# Patient Record
Sex: FEMALE | Race: WHITE | NOT HISPANIC OR LATINO | Employment: FULL TIME | URBAN - METROPOLITAN AREA
[De-identification: names, ages, dates, MRNs, and addresses within clinical notes are randomized per-mention and may not be internally consistent; named-entity substitution may affect disease eponyms.]

---

## 2021-07-01 ENCOUNTER — APPOINTMENT (EMERGENCY)
Dept: ULTRASOUND IMAGING | Facility: HOSPITAL | Age: 51
End: 2021-07-01
Payer: COMMERCIAL

## 2021-07-01 ENCOUNTER — HOSPITAL ENCOUNTER (EMERGENCY)
Facility: HOSPITAL | Age: 51
Discharge: HOME | End: 2021-07-01
Payer: COMMERCIAL

## 2021-07-01 ENCOUNTER — APPOINTMENT (EMERGENCY)
Dept: CT IMAGING | Facility: HOSPITAL | Age: 51
End: 2021-07-01
Payer: COMMERCIAL

## 2021-07-01 VITALS
TEMPERATURE: 97.9 F | RESPIRATION RATE: 18 BRPM | WEIGHT: 137 LBS | DIASTOLIC BLOOD PRESSURE: 56 MMHG | HEART RATE: 65 BPM | OXYGEN SATURATION: 99 % | SYSTOLIC BLOOD PRESSURE: 116 MMHG

## 2021-07-01 DIAGNOSIS — R10.11 RUQ PAIN: Primary | ICD-10-CM

## 2021-07-01 DIAGNOSIS — R74.01 TRANSAMINITIS: ICD-10-CM

## 2021-07-01 DIAGNOSIS — R16.0 HEPATOMEGALY: ICD-10-CM

## 2021-07-01 LAB
ALBUMIN SERPL BCP-MCNC: 4.4 G/DL (ref 3.4–4.8)
ALP SERPL-CCNC: 73.2 U/L (ref 35–140)
ALT SERPL W P-5'-P-CCNC: 103 U/L (ref 5–54)
ANION GAP SERPL CALCULATED.3IONS-SCNC: 5 MMOL/L (ref 4–13)
AST SERPL W P-5'-P-CCNC: 94 U/L (ref 15–41)
BASOPHILS # BLD AUTO: 0.03 THOUSANDS/ΜL (ref 0–0.1)
BASOPHILS NFR BLD AUTO: 0 % (ref 0–1)
BILIRUB SERPL-MCNC: 0.55 MG/DL (ref 0.3–1.2)
BILIRUB UR QL STRIP: NEGATIVE
BUN SERPL-MCNC: 12 MG/DL (ref 6–20)
CALCIUM SERPL-MCNC: 9.2 MG/DL (ref 8.4–10.2)
CHLORIDE SERPL-SCNC: 105 MMOL/L (ref 96–108)
CLARITY UR: CLEAR
CO2 SERPL-SCNC: 30 MMOL/L (ref 22–33)
COLOR UR: YELLOW
CREAT SERPL-MCNC: 0.93 MG/DL (ref 0.4–1.1)
EOSINOPHIL # BLD AUTO: 0.11 THOUSAND/ΜL (ref 0–0.61)
EOSINOPHIL NFR BLD AUTO: 1 % (ref 0–6)
ERYTHROCYTE [DISTWIDTH] IN BLOOD BY AUTOMATED COUNT: 12.6 % (ref 11.6–15.1)
EXT PREG TEST URINE: NEGATIVE
EXT. CONTROL ED NAV: NORMAL
GFR SERPL CREATININE-BSD FRML MDRD: 72 ML/MIN/1.73SQ M
GLUCOSE SERPL-MCNC: 77 MG/DL (ref 65–140)
GLUCOSE UR STRIP-MCNC: NEGATIVE MG/DL
HCT VFR BLD AUTO: 43.3 % (ref 34.8–46.1)
HGB BLD-MCNC: 14.3 G/DL (ref 11.5–15.4)
HGB UR QL STRIP.AUTO: NEGATIVE
IMM GRANULOCYTES # BLD AUTO: 0.01 THOUSAND/UL (ref 0–0.2)
IMM GRANULOCYTES NFR BLD AUTO: 0 % (ref 0–2)
KETONES UR STRIP-MCNC: NEGATIVE MG/DL
LEUKOCYTE ESTERASE UR QL STRIP: NEGATIVE
LIPASE SERPL-CCNC: 47 U/L (ref 13–60)
LYMPHOCYTES # BLD AUTO: 2.16 THOUSANDS/ΜL (ref 0.6–4.47)
LYMPHOCYTES NFR BLD AUTO: 28 % (ref 14–44)
MCH RBC QN AUTO: 30.5 PG (ref 26.8–34.3)
MCHC RBC AUTO-ENTMCNC: 33 G/DL (ref 31.4–37.4)
MCV RBC AUTO: 92 FL (ref 82–98)
MONOCYTES # BLD AUTO: 0.5 THOUSAND/ΜL (ref 0.17–1.22)
MONOCYTES NFR BLD AUTO: 6 % (ref 4–12)
NEUTROPHILS # BLD AUTO: 5 THOUSANDS/ΜL (ref 1.85–7.62)
NEUTS SEG NFR BLD AUTO: 65 % (ref 43–75)
NITRITE UR QL STRIP: NEGATIVE
PH UR STRIP.AUTO: 6.5 [PH]
PLATELET # BLD AUTO: 272 THOUSANDS/UL (ref 149–390)
PMV BLD AUTO: 10.5 FL (ref 8.9–12.7)
POTASSIUM SERPL-SCNC: 3.8 MMOL/L (ref 3.5–5)
PROT SERPL-MCNC: 7.1 G/DL (ref 6.4–8.3)
PROT UR STRIP-MCNC: NEGATIVE MG/DL
RBC # BLD AUTO: 4.69 MILLION/UL (ref 3.81–5.12)
SODIUM SERPL-SCNC: 140 MMOL/L (ref 133–145)
SP GR UR STRIP.AUTO: 1.01 (ref 1–1.03)
UROBILINOGEN UR QL STRIP.AUTO: 0.2 E.U./DL
WBC # BLD AUTO: 7.81 THOUSAND/UL (ref 4.31–10.16)

## 2021-07-01 PROCEDURE — 74176 CT ABD & PELVIS W/O CONTRAST: CPT

## 2021-07-01 PROCEDURE — 76705 ECHO EXAM OF ABDOMEN: CPT

## 2021-07-01 PROCEDURE — 80053 COMPREHEN METABOLIC PANEL: CPT | Performed by: PHYSICIAN ASSISTANT

## 2021-07-01 PROCEDURE — 99284 EMERGENCY DEPT VISIT MOD MDM: CPT | Performed by: PHYSICIAN ASSISTANT

## 2021-07-01 PROCEDURE — 81003 URINALYSIS AUTO W/O SCOPE: CPT | Performed by: PHYSICIAN ASSISTANT

## 2021-07-01 PROCEDURE — 99284 EMERGENCY DEPT VISIT MOD MDM: CPT

## 2021-07-01 PROCEDURE — 81025 URINE PREGNANCY TEST: CPT | Performed by: PHYSICIAN ASSISTANT

## 2021-07-01 PROCEDURE — 83690 ASSAY OF LIPASE: CPT | Performed by: PHYSICIAN ASSISTANT

## 2021-07-01 PROCEDURE — 96374 THER/PROPH/DIAG INJ IV PUSH: CPT

## 2021-07-01 PROCEDURE — G1004 CDSM NDSC: HCPCS

## 2021-07-01 PROCEDURE — 85025 COMPLETE CBC W/AUTO DIFF WBC: CPT | Performed by: PHYSICIAN ASSISTANT

## 2021-07-01 PROCEDURE — 36415 COLL VENOUS BLD VENIPUNCTURE: CPT | Performed by: PHYSICIAN ASSISTANT

## 2021-07-01 RX ORDER — KETOROLAC TROMETHAMINE 30 MG/ML
15 INJECTION, SOLUTION INTRAMUSCULAR; INTRAVENOUS ONCE
Status: COMPLETED | OUTPATIENT
Start: 2021-07-01 | End: 2021-07-01

## 2021-07-01 RX ADMIN — KETOROLAC TROMETHAMINE 15 MG: 30 INJECTION, SOLUTION INTRAMUSCULAR at 18:06

## 2021-07-01 NOTE — DISCHARGE INSTRUCTIONS
Follow-up with Gastroenterology on an outpatient basis for your persistent right upper quadrant pain, enlarged liver and elevated liver function tests

## 2021-07-01 NOTE — ED PROVIDER NOTES
History  Chief Complaint   Patient presents with    Abdominal Pain     Pt with R sided abd pain for last 2 hours  Denies n/v/d at this time     69-year-old female comes in today complaining of right upper quadrant pain that has been ongoing for years, however became acutely worse about 2 hours ago after eating potato salad  She reports that the pain is constant, however it gets worse in waves where it is sharp stabbing and shooting  She reports that the pain migrates inferiorly a little bit  She tells me that the pain feels better when she braces that right side  Pain does not radiate to her back  She has no associated nausea, vomiting, diarrhea, fevers  She does not see a primary care physician on a regular basis  She does not drink alcohol or use any drugs  She does have a medical marijuana card  She still menstruates and her LMP was about 1 week ago          None       History reviewed  No pertinent past medical history  History reviewed  No pertinent surgical history  History reviewed  No pertinent family history  I have reviewed and agree with the history as documented  E-Cigarette/Vaping     E-Cigarette/Vaping Substances     Social History     Tobacco Use    Smoking status: Former Smoker   Substance Use Topics    Alcohol use: Not Currently    Drug use: Never       Review of Systems   Constitutional: Negative for fatigue and fever  HENT: Negative for ear pain, rhinorrhea and sore throat  Eyes: Negative for visual disturbance  Respiratory: Negative for cough and shortness of breath  Cardiovascular: Negative for chest pain  Gastrointestinal: Positive for abdominal pain  Negative for diarrhea, nausea and vomiting  Genitourinary: Negative for dysuria  Musculoskeletal: Negative for back pain  Skin: Negative for rash  Neurological: Negative for headaches  Psychiatric/Behavioral: Negative for behavioral problems         Physical Exam  Physical Exam  Vitals and nursing note reviewed  Constitutional:       General: She is not in acute distress  Appearance: Normal appearance  She is not ill-appearing or toxic-appearing  HENT:      Head: Normocephalic and atraumatic  Eyes:      Extraocular Movements: Extraocular movements intact  Cardiovascular:      Rate and Rhythm: Normal rate and regular rhythm  Pulmonary:      Effort: Pulmonary effort is normal       Breath sounds: Normal breath sounds  Abdominal:      General: Abdomen is flat  Bowel sounds are normal       Palpations: Abdomen is soft  Tenderness: There is abdominal tenderness in the right upper quadrant  There is no right CVA tenderness or left CVA tenderness  Musculoskeletal:         General: Normal range of motion  Cervical back: Normal range of motion  Skin:     General: Skin is warm and dry  Findings: Erythema (Mild erythema from her pushing and holding onto her side) present  No rash (No shingles type rash)  Neurological:      General: No focal deficit present  Mental Status: She is alert     Psychiatric:         Mood and Affect: Mood normal          Behavior: Behavior normal          Vital Signs  ED Triage Vitals   Temperature Pulse Respirations Blood Pressure SpO2   07/01/21 1739 07/01/21 1739 07/01/21 1739 07/01/21 1739 07/01/21 1739   97 9 °F (36 6 °C) 56 18 109/86 100 %      Temp src Heart Rate Source Patient Position - Orthostatic VS BP Location FiO2 (%)   -- -- -- -- --             Pain Score       07/01/21 1747       3           Vitals:    07/01/21 1739   BP: 109/86   Pulse: 56         Visual Acuity      ED Medications  Medications   ketorolac (TORADOL) injection 15 mg (15 mg Intravenous Given 7/1/21 1806)       Diagnostic Studies  Results Reviewed     Procedure Component Value Units Date/Time    CMP [[id number]]  (Abnormal) Collected: 07/01/21 1753    Lab Status: Final result Specimen: Blood from Arm, Right Updated: 07/01/21 1814     Sodium 140 mmol/L      Potassium 3 8 mmol/L Chloride 105 mmol/L      CO2 30 mmol/L      ANION GAP 5 mmol/L      BUN 12 mg/dL      Creatinine 0 93 mg/dL      Glucose 77 mg/dL      Calcium 9 2 mg/dL      AST 94 U/L       U/L      Alkaline Phosphatase 73 2 U/L      Total Protein 7 1 g/dL      Albumin 4 4 g/dL      Total Bilirubin 0 55 mg/dL      eGFR 72 ml/min/1 73sq m     Narrative:      National Kidney Disease Foundation guidelines for Chronic Kidney Disease (CKD):     Stage 1 with normal or high GFR (GFR > 90 mL/min/1 73 square meters)    Stage 2 Mild CKD (GFR = 60-89 mL/min/1 73 square meters)    Stage 3A Moderate CKD (GFR = 45-59 mL/min/1 73 square meters)    Stage 3B Moderate CKD (GFR = 30-44 mL/min/1 73 square meters)    Stage 4 Severe CKD (GFR = 15-29 mL/min/1 73 square meters)    Stage 5 End Stage CKD (GFR <15 mL/min/1 73 square meters)  Note: GFR calculation is accurate only with a steady state creatinine    Lipase [[id number]]  (Normal) Collected: 07/01/21 1753    Lab Status: Final result Specimen: Blood from Arm, Right Updated: 07/01/21 1814     Lipase 47 u/L     UA w Reflex to Microscopic w Reflex to Culture [[id number]]  (Normal) Collected: 07/01/21 1805    Lab Status: Final result Specimen: Urine, Clean Catch Updated: 07/01/21 1814     Color, UA Yellow     Clarity, UA Clear     Specific Gravity, UA 1 010     pH, UA 6 5     Leukocytes, UA Negative     Nitrite, UA Negative     Protein, UA Negative mg/dl      Glucose, UA Negative mg/dl      Ketones, UA Negative mg/dl      Urobilinogen, UA 0 2 E U /dl      Bilirubin, UA Negative     Blood, UA Negative    CBC and differential [[id number]]  (Normal) Collected: 07/01/21 1753    Lab Status: Final result Specimen: Blood from Arm, Right Updated: 07/01/21 1810     WBC 7 81 Thousand/uL      RBC 4 69 Million/uL      Hemoglobin 14 3 g/dL      Hematocrit 43 3 %      MCV 92 fL      MCH 30 5 pg      MCHC 33 0 g/dL      RDW 12 6 %      MPV 10 5 fL      Platelets 716 Thousands/uL      Neutrophils Relative 65 %      Immat GRANS % 0 %      Lymphocytes Relative 28 %      Monocytes Relative 6 %      Eosinophils Relative 1 %      Basophils Relative 0 %      Neutrophils Absolute 5 00 Thousands/µL      Immature Grans Absolute 0 01 Thousand/uL      Lymphocytes Absolute 2 16 Thousands/µL      Monocytes Absolute 0 50 Thousand/µL      Eosinophils Absolute 0 11 Thousand/µL      Basophils Absolute 0 03 Thousands/µL     POCT pregnancy, urine [[id number]]  (Normal) Resulted: 07/01/21 1806    Lab Status: Final result Specimen: Urine Updated: 07/01/21 1806     EXT PREG TEST UR (Ref: Negative) negative     Control valid                 CT renal stone study abdomen pelvis wo contrast   Final Result by Malon Riedel, DO (07/01 1903)   No CT evidence of renal colic  The amount of formed stool the colon suggests mild constipation  Normal-appearing appendix  Hepatomegaly  Workstation performed: [id number]         US gallbladder   Final Result by Malon Riedel, DO (07/01 1851)   Unremarkable pancreas  Hepatomegaly  Unremarkable gallbladder with prominent fold  Common bile duct is slightly dilated at 7 mm, distal CBD is obscured by bowel gas  Unremarkable right kidney  Serum bilirubin is normal   If there is some clinical reason to suspect choledocholithiasis: Consider MRCP  Workstation performed: [id number]                    Procedures  Procedures         ED Course  ED Course as of Jul 01 1935   Bruce Postal Jul 01, 2021   5535 Patient reports that her pain is only a slight discomfort and is less than a 3/10  She feels comfortable going home and will avoid Tylenol and alcohol  She will follow-up with Gastroenterology on an outpatient basis for her hepatomegaly and elevated AST ALT  Explained to her she may need MRCP for her gallbladder  Gallbladder is unremarkable on ultrasound and CT  No evidence of renal stone or appendicitis    Labs are otherwise unremarkable as are her vitals SBIRT 22yo+      Most Recent Value   SBIRT (22 yo +)   In order to provide better care to our patients, we are screening all of our patients for alcohol and drug use  Would it be okay to ask you these screening questions? Yes Filed at: 07/01/2021 1745   Initial Alcohol Screen: US AUDIT-C    1  How often do you have a drink containing alcohol?  0 Filed at: 07/01/2021 1745   2  How many drinks containing alcohol do you have on a typical day you are drinking? 0 Filed at: 07/01/2021 1745   3a  Male UNDER 65: How often do you have five or more drinks on one occasion? 0 Filed at: 07/01/2021 1745   3b  FEMALE Any Age, or MALE 65+: How often do you have 4 or more drinks on one occassion? 0 Filed at: 07/01/2021 1745   Audit-C Score  0 Filed at: 07/01/2021 1745   JENARO: How many times in the past year have you    Used an illegal drug or used a prescription medication for non-medical reasons? Never Filed at: 07/01/2021 1745                    MDM  Number of Diagnoses or Management Options  Hepatomegaly  RUQ pain  Transaminitis  Diagnosis management comments: I considered acute cholecystitis, acute cholangitis, hepatitis, pyelonephritis, ureterolithiasis, shingles and other diagnoses    Portions of the record may have been created with voice recognition software  Occasional wrong word or "sound a like" substitutions may have occurred due to the inherent limitations of voice recognition software  Read the chart carefully and recognize, using context, where substitutions have occurred    Disposition  Final diagnoses:   Transaminitis   RUQ pain   Hepatomegaly     Time reflects when diagnosis was documented in both MDM as applicable and the Disposition within this note     Time User Action Codes Description Comment    7/1/2021  7:19 PM Anastacio Jimenez Add [R74 01] Transaminitis     7/1/2021  7:19 PM Anastacio Jimenez Add [R10 11] RUQ pain     7/1/2021  7:29 PM Anastacio Jimenez Modify [R74 01] Transaminitis     7/1/2021  7:29 PM Lore Nassar Modify [R10 11] RUQ pain     7/1/2021  7:31 PM Lore Nassar Add [R16 0] Hepatomegaly       ED Disposition     ED Disposition Condition Date/Time Comment    Discharge Stable Thu Jul 1, 2021  7:29 PM Xenia Coto [place] discharge to home/self care              Follow-up Information     Follow up With Specialties Details Why Contact Info Additional Information    [facility] Go to  For your elevated liver function tests and your right upper quadrant pain [address] [place] [address] [id number]  [address] [facility] [address] [place] [address], [place], [place], [address], [address] Schedule an appointment as soon as possible for a visit   [address] [id number]  [address] , [facility] [place], [place], [address]    [address]  Call  As needed [phone]             Patient's Medications    No medications on file         [address] Review     None          ED Provider  Electronically Signed by           Liz Power PA-C  07/01/21 1935

## 2021-08-05 ENCOUNTER — OFFICE VISIT (OUTPATIENT)
Dept: GASTROENTEROLOGY | Facility: CLINIC | Age: 51
End: 2021-08-05
Payer: COMMERCIAL

## 2021-08-05 VITALS — WEIGHT: 130 LBS | SYSTOLIC BLOOD PRESSURE: 102 MMHG | HEART RATE: 77 BPM | DIASTOLIC BLOOD PRESSURE: 67 MMHG

## 2021-08-05 DIAGNOSIS — R74.01 TRANSAMINITIS: ICD-10-CM

## 2021-08-05 DIAGNOSIS — R10.11 RIGHT UPPER QUADRANT ABDOMINAL PAIN: Primary | ICD-10-CM

## 2021-08-05 DIAGNOSIS — R93.89 ABNORMAL ULTRASOUND: ICD-10-CM

## 2021-08-05 DIAGNOSIS — R79.89 ABNORMAL LFTS: ICD-10-CM

## 2021-08-05 PROCEDURE — 99204 OFFICE O/P NEW MOD 45 MIN: CPT | Performed by: INTERNAL MEDICINE

## 2021-08-05 RX ORDER — PANTOPRAZOLE SODIUM 40 MG/1
40 TABLET, DELAYED RELEASE ORAL DAILY
Qty: 30 TABLET | Refills: 2 | Status: SHIPPED | OUTPATIENT
Start: 2021-08-05

## 2021-08-05 NOTE — H&P (VIEW-ONLY)
[facility] [address] [facility] Consultation  Shelly Lind 53 y o  female MRN: [id number]  Encounter: [id number]          ASSESSMENT AND PLAN:      1  Right upper quadrant abdominal pain  -     Hepatic function panel; Future  -     RUBEN Screen w/ Reflex to Titer/Pattern; Future  -     Chronic Hepatitis Panel; Future  -     pantoprazole (PROTONIX) 40 mg tablet; Take 1 tablet (40 mg total) by mouth daily  -     EGD; Future; Expected date: 08/05/2021    2  Transaminitis  -     Ambulatory referral to Gastroenterology  -     Hepatic function panel; Future  -     RUBEN Screen w/ Reflex to Titer/Pattern; Future  -     Chronic Hepatitis Panel; Future  -     pantoprazole (PROTONIX) 40 mg tablet; Take 1 tablet (40 mg total) by mouth daily    3  Abnormal ultrasound  -     Hepatic function panel; Future  -     RUBEN Screen w/ Reflex to Titer/Pattern; Future  -     Chronic Hepatitis Panel; Future  -     pantoprazole (PROTONIX) 40 mg tablet; Take 1 tablet (40 mg total) by mouth daily  -     EGD; Future; Expected date: 08/05/2021    4  Abnormal LFTs  -     Chronic Hepatitis Panel; Future      Patient has these episodes of right upper quadrant right flank pain, ultrasound of the gallbladder CT of the abdomen chest unremarkable for any biliary pathology, this pain may be from intestinal spasms, nonulcer dyspepsia/visceral hypersensitivity  No evidence of acute abdomen ileus obstruction  Advised her to watch diet avoid gassy fried fatty foods dairy products and trial of omeprazole  Schedule EGD  Mild elevation of AST ALT of 94/103, CBD somewhat prominent, though I still doubt any biliary obstruction  Will repeat LFTs check for hepatitis-B C RUBEN and follow-up thereafter  If need be will try antispasmodic  This plan was discussed with patient she agrees    ______________________________________________________________________    HPI:      Thank for asthma see Jacinto Turner for consultation for right upper quadrant pain   She went to the emergency room for this right upper quadrant right flank pain, evaluation was done with CT scan of the abdomen ultrasound CT of the chest, generally unremarkable except for slightly prominent common bile duct  Mild elevation of AST ALT  Patient denies any known history of liver disease jaundice hepatitis blood transfusions, denies any high-risk behavior  Generally in good health exercises, this pain is more like a spastic pain like sometimes stitch on the side  Denies dysphagia coughing choking spells, occasional indigestion  Drinks 4 cups of coffee a day  Bowels are regular, no apparent blood clot occasional constipation  Exercises regularly,  middle school,  lives alone  REVIEW OF SYSTEMS:    CONSTITUTIONAL: Denies any fever, chills, rigors, and weight loss  HEENT: No earache or tinnitus  CARDIOVASCULAR: No chest pain or palpitations  RESPIRATORY: Denies any cough, hemoptysis, shortness of breath or dyspnea on exertion  GASTROINTESTINAL: As noted in the History of Present Illness  GENITOURINARY: Denies any hematuria or dysuria  NEUROLOGIC: No dizziness or vertigo  MUSCULOSKELETAL: Denies any joint swellings  SKIN: Denies skin rashes or itching  ENDOCRINE: Denies excessive thirst  Denies intolerance to heat or cold  PSYCHOSOCIAL: Denies depression or anxiety  Denies any recent memory loss  Historical Information   Past Medical History:   Diagnosis Date    Anxiety      History reviewed  No pertinent surgical history  Social History   Social History     Substance and Sexual Activity   Alcohol Use Not Currently     Social History     Substance and Sexual Activity   Drug Use Never     Social History     Tobacco Use   Smoking Status Former Smoker   Smokeless Tobacco Never Used     History reviewed  No pertinent family history      Meds/Allergies       Current Outpatient Medications:     pantoprazole (PROTONIX) 40 mg tablet    No Known Allergies        Objective     Blood pressure 102/67, pulse 77, weight 59 kg (130 lb)  There is no height or weight on file to calculate BMI  PHYSICAL EXAM:      General Appearance:   Alert, cooperative, no distress   HEENT:   Normocephalic, atraumatic, anicteric  Neck:  Supple, symmetrical, trachea midline   Lungs:   Clear to auscultation bilaterally; no rales, rhonchi or wheezing; respirations unlabored    Heart[de-identified]   Regular rate and rhythm; no murmur  Abdomen:   Soft, non-tender, non-distended; normal bowel sounds; no masses, no organomegaly    Genitalia:   Deferred    Rectal:   Deferred    Extremities:  No cyanosis, clubbing or edema    Skin:  No jaundice, rashes, or lesions    Lymph nodes:  No palpable cervical lymphadenopathy        Lab Results:   No visits with results within 1 Day(s) from this visit     Latest known visit with results is:   Admission on 07/01/2021, Discharged on 07/01/2021   Component Date Value    WBC 07/01/2021 7 81     RBC 07/01/2021 4 69     Hemoglobin 07/01/2021 14 3     Hematocrit 07/01/2021 43 3     MCV 07/01/2021 92     MCH 07/01/2021 30 5     MCHC 07/01/2021 33 0     RDW 07/01/2021 12 6     MPV 07/01/2021 10 5     Platelets 82/82/5955 272     Neutrophils Relative 07/01/2021 65     Immat GRANS % 07/01/2021 0     Lymphocytes Relative 07/01/2021 28     Monocytes Relative 07/01/2021 6     Eosinophils Relative 07/01/2021 1     Basophils Relative 07/01/2021 0     Neutrophils Absolute 07/01/2021 5 00     Immature Grans Absolute 07/01/2021 0 01     Lymphocytes Absolute 07/01/2021 2 16     Monocytes Absolute 07/01/2021 0 50     Eosinophils Absolute 07/01/2021 0 11     Basophils Absolute 07/01/2021 0 03     Sodium 07/01/2021 140     Potassium 07/01/2021 3 8     Chloride 07/01/2021 105     CO2 07/01/2021 30     ANION GAP 07/01/2021 5     BUN 07/01/2021 12     Creatinine 07/01/2021 0 93     Glucose 07/01/2021 77     Calcium 07/01/2021 9 2     AST 07/01/2021 94*    ALT 07/01/2021 103*    Alkaline Phosphatase 07/01/2021 73 2     Total Protein 07/01/2021 7 1     Albumin 07/01/2021 4 4     Total Bilirubin 07/01/2021 0 55     eGFR 07/01/2021 72     Lipase 07/01/2021 47     EXT PREG TEST UR (Ref: N* 07/01/2021 negative     Control 07/01/2021 valid     Color, UA 07/01/2021 Yellow     Clarity, UA 07/01/2021 Clear     Specific Gravity, UA 07/01/2021 1 010     pH, UA 07/01/2021 6 5     Leukocytes, UA 07/01/2021 Negative     Nitrite, UA 07/01/2021 Negative     Protein, UA 07/01/2021 Negative     Glucose, UA 07/01/2021 Negative     Ketones, UA 07/01/2021 Negative     Urobilinogen, UA 07/01/2021 0 2     Bilirubin, UA 07/01/2021 Negative     Blood, UA 07/01/2021 Negative          Radiology Results:   No results found

## 2021-08-05 NOTE — PROGRESS NOTES
[address] [facility] Consultation  Shekhar Newman 53 y o  female MRN: [id number]  Encounter: [id number]          ASSESSMENT AND PLAN:      1  Right upper quadrant abdominal pain  -     Hepatic function panel; Future  -     RUBEN Screen w/ Reflex to Titer/Pattern; Future  -     Chronic Hepatitis Panel; Future  -     pantoprazole (PROTONIX) 40 mg tablet; Take 1 tablet (40 mg total) by mouth daily  -     EGD; Future; Expected date: 08/05/2021    2  Transaminitis  -     Ambulatory referral to Gastroenterology  -     Hepatic function panel; Future  -     RUBEN Screen w/ Reflex to Titer/Pattern; Future  -     Chronic Hepatitis Panel; Future  -     pantoprazole (PROTONIX) 40 mg tablet; Take 1 tablet (40 mg total) by mouth daily    3  Abnormal ultrasound  -     Hepatic function panel; Future  -     RUBEN Screen w/ Reflex to Titer/Pattern; Future  -     Chronic Hepatitis Panel; Future  -     pantoprazole (PROTONIX) 40 mg tablet; Take 1 tablet (40 mg total) by mouth daily  -     EGD; Future; Expected date: 08/05/2021    4  Abnormal LFTs  -     Chronic Hepatitis Panel; Future      Patient has these episodes of right upper quadrant right flank pain, ultrasound of the gallbladder CT of the abdomen chest unremarkable for any biliary pathology, this pain may be from intestinal spasms, nonulcer dyspepsia/visceral hypersensitivity  No evidence of acute abdomen ileus obstruction  Advised her to watch diet avoid gassy fried fatty foods dairy products and trial of omeprazole  Schedule EGD  Mild elevation of AST ALT of 94/103, CBD somewhat prominent, though I still doubt any biliary obstruction  Will repeat LFTs check for hepatitis-B C RUBEN and follow-up thereafter  If need be will try antispasmodic  This plan was discussed with patient she agrees    ______________________________________________________________________    HPI:      Thank for asthma see Al[facility] Gamez for consultation for right upper quadrant pain   She went to the emergency room for this right upper quadrant right flank pain, evaluation was done with CT scan of the abdomen ultrasound CT of the chest, generally unremarkable except for slightly prominent common bile duct  Mild elevation of AST ALT  Patient denies any known history of liver disease jaundice hepatitis blood transfusions, denies any high-risk behavior  Generally in good health exercises, this pain is more like a spastic pain like sometimes stitch on the side  Denies dysphagia coughing choking spells, occasional indigestion  Drinks 4 cups of coffee a day  Bowels are regular, no apparent blood clot occasional constipation  Exercises regularly,  middle school,  lives alone  REVIEW OF SYSTEMS:    CONSTITUTIONAL: Denies any fever, chills, rigors, and weight loss  HEENT: No earache or tinnitus  CARDIOVASCULAR: No chest pain or palpitations  RESPIRATORY: Denies any cough, hemoptysis, shortness of breath or dyspnea on exertion  GASTROINTESTINAL: As noted in the History of Present Illness  GENITOURINARY: Denies any hematuria or dysuria  NEUROLOGIC: No dizziness or vertigo  MUSCULOSKELETAL: Denies any joint swellings  SKIN: Denies skin rashes or itching  ENDOCRINE: Denies excessive thirst  Denies intolerance to heat or cold  PSYCHOSOCIAL: Denies depression or anxiety  Denies any recent memory loss  Historical Information   Past Medical History:   Diagnosis Date    Anxiety      History reviewed  No pertinent surgical history  Social History   Social History     Substance and Sexual Activity   Alcohol Use Not Currently     Social History     Substance and Sexual Activity   Drug Use Never     Social History     Tobacco Use   Smoking Status Former Smoker   Smokeless Tobacco Never Used     History reviewed  No pertinent family history      Meds/Allergies       Current Outpatient Medications:     pantoprazole (PROTONIX) 40 mg tablet    No Known Allergies        Objective     Blood pressure 102/67, pulse 77, weight 59 kg (130 lb)  There is no height or weight on file to calculate BMI  PHYSICAL EXAM:      General Appearance:   Alert, cooperative, no distress   HEENT:   Normocephalic, atraumatic, anicteric  Neck:  Supple, symmetrical, trachea midline   Lungs:   Clear to auscultation bilaterally; no rales, rhonchi or wheezing; respirations unlabored    Heart[de-identified]   Regular rate and rhythm; no murmur  Abdomen:   Soft, non-tender, non-distended; normal bowel sounds; no masses, no organomegaly    Genitalia:   Deferred    Rectal:   Deferred    Extremities:  No cyanosis, clubbing or edema    Skin:  No jaundice, rashes, or lesions    Lymph nodes:  No palpable cervical lymphadenopathy        Lab Results:   No visits with results within 1 Day(s) from this visit     Latest known visit with results is:   Admission on 07/01/2021, Discharged on 07/01/2021   Component Date Value    WBC 07/01/2021 7 81     RBC 07/01/2021 4 69     Hemoglobin 07/01/2021 14 3     Hematocrit 07/01/2021 43 3     MCV 07/01/2021 92     MCH 07/01/2021 30 5     MCHC 07/01/2021 33 0     RDW 07/01/2021 12 6     MPV 07/01/2021 10 5     Platelets 85/06/6810 272     Neutrophils Relative 07/01/2021 65     Immat GRANS % 07/01/2021 0     Lymphocytes Relative 07/01/2021 28     Monocytes Relative 07/01/2021 6     Eosinophils Relative 07/01/2021 1     Basophils Relative 07/01/2021 0     Neutrophils Absolute 07/01/2021 5 00     Immature Grans Absolute 07/01/2021 0 01     Lymphocytes Absolute 07/01/2021 2 16     Monocytes Absolute 07/01/2021 0 50     Eosinophils Absolute 07/01/2021 0 11     Basophils Absolute 07/01/2021 0 03     Sodium 07/01/2021 140     Potassium 07/01/2021 3 8     Chloride 07/01/2021 105     CO2 07/01/2021 30     ANION GAP 07/01/2021 5     BUN 07/01/2021 12     Creatinine 07/01/2021 0 93     Glucose 07/01/2021 77     Calcium 07/01/2021 9 2     AST 07/01/2021 94*    ALT 07/01/2021 103*    Alkaline Phosphatase 07/01/2021 73 2     Total Protein 07/01/2021 7 1     Albumin 07/01/2021 4 4     Total Bilirubin 07/01/2021 0 55     eGFR 07/01/2021 72     Lipase 07/01/2021 47     EXT PREG TEST UR (Ref: N* 07/01/2021 negative     Control 07/01/2021 valid     Color, UA 07/01/2021 Yellow     Clarity, UA 07/01/2021 Clear     Specific Gravity, UA 07/01/2021 1 010     pH, UA 07/01/2021 6 5     Leukocytes, UA 07/01/2021 Negative     Nitrite, UA 07/01/2021 Negative     Protein, UA 07/01/2021 Negative     Glucose, UA 07/01/2021 Negative     Ketones, UA 07/01/2021 Negative     Urobilinogen, UA 07/01/2021 0 2     Bilirubin, UA 07/01/2021 Negative     Blood, UA 07/01/2021 Negative          Radiology Results:   No results found

## 2021-08-11 ENCOUNTER — APPOINTMENT (OUTPATIENT)
Dept: LAB | Facility: HOSPITAL | Age: 51
End: 2021-08-11
Attending: INTERNAL MEDICINE
Payer: COMMERCIAL

## 2021-08-11 DIAGNOSIS — R74.01 TRANSAMINITIS: ICD-10-CM

## 2021-08-11 DIAGNOSIS — R10.11 RIGHT UPPER QUADRANT ABDOMINAL PAIN: ICD-10-CM

## 2021-08-11 DIAGNOSIS — R79.89 ABNORMAL LFTS: ICD-10-CM

## 2021-08-11 DIAGNOSIS — R93.89 ABNORMAL ULTRASOUND: ICD-10-CM

## 2021-08-11 DIAGNOSIS — R74.02 NONSPECIFIC ELEVATION OF LEVELS OF TRANSAMINASE OR LACTIC ACID DEHYDROGENASE (LDH): ICD-10-CM

## 2021-08-11 DIAGNOSIS — R74.01 NONSPECIFIC ELEVATION OF LEVELS OF TRANSAMINASE OR LACTIC ACID DEHYDROGENASE (LDH): ICD-10-CM

## 2021-08-11 LAB
ALBUMIN SERPL BCP-MCNC: 4.4 G/DL (ref 3.4–4.8)
ALP SERPL-CCNC: 63.4 U/L (ref 35–140)
ALT SERPL W P-5'-P-CCNC: 20 U/L (ref 5–54)
AST SERPL W P-5'-P-CCNC: 26 U/L (ref 15–41)
BILIRUB DIRECT SERPL-MCNC: 0.09 MG/DL (ref 0–0.3)
BILIRUB SERPL-MCNC: 0.61 MG/DL (ref 0.3–1.2)
HAV IGM SER QL: NORMAL
HBV CORE AB SER QL: NORMAL
HBV CORE IGM SER QL: NORMAL
HBV CORE IGM SER QL: NORMAL
HBV SURFACE AG SER QL: NORMAL
HBV SURFACE AG SER QL: NORMAL
HCV AB SER QL: NORMAL
HCV AB SER QL: NORMAL
PROT SERPL-MCNC: 6.9 G/DL (ref 6.4–8.3)

## 2021-08-11 PROCEDURE — 36415 COLL VENOUS BLD VENIPUNCTURE: CPT

## 2021-08-11 PROCEDURE — 86704 HEP B CORE ANTIBODY TOTAL: CPT

## 2021-08-11 PROCEDURE — 80076 HEPATIC FUNCTION PANEL: CPT

## 2021-08-11 PROCEDURE — 80074 ACUTE HEPATITIS PANEL: CPT

## 2021-08-11 PROCEDURE — 86038 ANTINUCLEAR ANTIBODIES: CPT

## 2021-08-13 LAB — RYE IGE QN: NEGATIVE

## 2021-08-17 ENCOUNTER — TELEPHONE (OUTPATIENT)
Dept: GASTROENTEROLOGY | Facility: HOSPITAL | Age: 51
End: 2021-08-17

## 2021-08-18 ENCOUNTER — ANESTHESIA EVENT (OUTPATIENT)
Dept: GASTROENTEROLOGY | Facility: HOSPITAL | Age: 51
End: 2021-08-18

## 2021-08-18 ENCOUNTER — HOSPITAL ENCOUNTER (OUTPATIENT)
Dept: GASTROENTEROLOGY | Facility: HOSPITAL | Age: 51
Setting detail: OUTPATIENT SURGERY
Discharge: HOME | End: 2021-08-18
Attending: INTERNAL MEDICINE | Admitting: INTERNAL MEDICINE
Payer: COMMERCIAL

## 2021-08-18 ENCOUNTER — ANESTHESIA (OUTPATIENT)
Dept: GASTROENTEROLOGY | Facility: HOSPITAL | Age: 51
End: 2021-08-18

## 2021-08-18 VITALS
OXYGEN SATURATION: 100 % | BODY MASS INDEX: 21.21 KG/M2 | WEIGHT: 132 LBS | HEART RATE: 58 BPM | SYSTOLIC BLOOD PRESSURE: 102 MMHG | RESPIRATION RATE: 17 BRPM | TEMPERATURE: 98.2 F | DIASTOLIC BLOOD PRESSURE: 64 MMHG | HEIGHT: 66 IN

## 2021-08-18 DIAGNOSIS — R10.11 RIGHT UPPER QUADRANT ABDOMINAL PAIN: ICD-10-CM

## 2021-08-18 DIAGNOSIS — R93.89 ABNORMAL ULTRASOUND: ICD-10-CM

## 2021-08-18 PROBLEM — F41.9 ANXIETY: Status: ACTIVE | Noted: 2021-08-18

## 2021-08-18 LAB
EXT PREGNANCY TEST URINE: NEGATIVE
EXT. CONTROL: NORMAL

## 2021-08-18 PROCEDURE — 43239 EGD BIOPSY SINGLE/MULTIPLE: CPT | Performed by: INTERNAL MEDICINE

## 2021-08-18 PROCEDURE — 88305 TISSUE EXAM BY PATHOLOGIST: CPT | Performed by: PATHOLOGY

## 2021-08-18 PROCEDURE — 81025 URINE PREGNANCY TEST: CPT | Performed by: ANESTHESIOLOGY

## 2021-08-18 RX ORDER — SODIUM CHLORIDE, SODIUM LACTATE, POTASSIUM CHLORIDE, CALCIUM CHLORIDE 600; 310; 30; 20 MG/100ML; MG/100ML; MG/100ML; MG/100ML
125 INJECTION, SOLUTION INTRAVENOUS CONTINUOUS
Status: DISCONTINUED | OUTPATIENT
Start: 2021-08-18 | End: 2021-08-22 | Stop reason: HOSPADM

## 2021-08-18 RX ORDER — PROPOFOL 10 MG/ML
INJECTION, EMULSION INTRAVENOUS AS NEEDED
Status: DISCONTINUED | OUTPATIENT
Start: 2021-08-18 | End: 2021-08-18

## 2021-08-18 RX ORDER — SODIUM CHLORIDE, SODIUM LACTATE, POTASSIUM CHLORIDE, CALCIUM CHLORIDE 600; 310; 30; 20 MG/100ML; MG/100ML; MG/100ML; MG/100ML
INJECTION, SOLUTION INTRAVENOUS CONTINUOUS PRN
Status: DISCONTINUED | OUTPATIENT
Start: 2021-08-18 | End: 2021-08-18

## 2021-08-18 RX ORDER — LIDOCAINE HYDROCHLORIDE 10 MG/ML
INJECTION, SOLUTION EPIDURAL; INFILTRATION; INTRACAUDAL; PERINEURAL AS NEEDED
Status: DISCONTINUED | OUTPATIENT
Start: 2021-08-18 | End: 2021-08-18

## 2021-08-18 RX ADMIN — SODIUM CHLORIDE, SODIUM LACTATE, POTASSIUM CHLORIDE, AND CALCIUM CHLORIDE 125 ML/HR: .6; .31; .03; .02 INJECTION, SOLUTION INTRAVENOUS at 08:05

## 2021-08-18 RX ADMIN — LIDOCAINE HYDROCHLORIDE 50 MG: 10 INJECTION, SOLUTION EPIDURAL; INFILTRATION; INTRACAUDAL; PERINEURAL at 08:13

## 2021-08-18 RX ADMIN — SODIUM CHLORIDE, SODIUM LACTATE, POTASSIUM CHLORIDE, AND CALCIUM CHLORIDE: .6; .31; .03; .02 INJECTION, SOLUTION INTRAVENOUS at 08:10

## 2021-08-18 RX ADMIN — PROPOFOL 200 MG: 10 INJECTION, EMULSION INTRAVENOUS at 08:13

## 2021-08-18 NOTE — DISCHARGE INSTRUCTIONS
Upper Endoscopy   WHAT YOU NEED TO KNOW:   An upper endoscopy is also called an upper gastrointestinal (GI) endoscopy, or an esophagogastroduodenoscopy (EGD)  It is a procedure to examine the inside of your esophagus, stomach, and duodenum (first part of the small intestine) with a scope  You may feel bloated, gassy, or have some abdominal discomfort after your procedure  Your throat may be sore for 24 to 36 hours  You may burp or pass gas from air that is still inside your body  DISCHARGE INSTRUCTIONS:   Seek care immediately if:    You have sudden, severe abdominal pain   You have problems swallowing   You have a large amount of black, sticky bowel movements or blood in your bowel movements   You have sudden trouble breathing   You feel weak, lightheaded, or faint or your heart beats faster than normal for you  Contact your healthcare provider if:    You have a fever and chills   You have nausea or are vomiting   Your abdomen is bloated or feels full and hard   You have abdominal pain   You have black, sticky bowel movements or blood in your bowel movements   You have not had a bowel movement for 3 days after your procedure   You have rash or hives   You have questions or concerns about your procedure  Activity:    Do not lift, strain, or run for 24 hours after your procedure   Rest after your procedure  You have been given medicine to relax you  Do not drive or make important decisions until the day after your procedure  Return to your normal activity as directed   Relieve gas and discomfort from bloating by lying on your right side with a heating pad on your abdomen  You may need to take short walks to help the gas move out  Eat small meals until bloating is relieved  Follow up with your healthcare provider as directed: Write down your questions so you remember to ask them during your visits       If you take a blood thinner, please review the specific instructions from your endoscopist about when you should resume it  These can be found in the Recommendation and Your Medication list sections of this After Visit Summary  Hiatal Hernia   WHAT YOU NEED TO KNOW:   A hiatal hernia is a condition that causes part of your stomach to bulge through the hiatus (small opening) in your diaphragm  The part of the stomach may move up and down, or it may get trapped above the diaphragm  DISCHARGE INSTRUCTIONS:   Seek care immediately if:   · You have severe abdominal pain  · You try to vomit but nothing comes out (retching)  · You have severe chest pain and sudden trouble breathing  · Your bowel movements are black or bloody  · Your vomit looks like coffee grounds or has blood in it  Contact your healthcare provider if:   · Your symptoms are getting worse  · You have nausea, and you are vomiting  · You are losing weight without trying  · You have questions or concerns about your condition or care  Medicines:   · Medicines  may be given to relieve heartburn symptoms  These medicines help to decrease or block stomach acid  You may also be given medicines that help to tighten the esophageal sphincter  · Take your medicine as directed  Contact your healthcare provider if you think your medicine is not helping or if you have side effects  Tell him or her if you are allergic to any medicine  Keep a list of the medicines, vitamins, and herbs you take  Include the amounts, and when and why you take them  Bring the list or the pill bottles to follow-up visits  Carry your medicine list with you in case of an emergency  Follow up with your healthcare provider as directed:  Write down your questions so you remember to ask them during your visits  Self care:   · Avoid foods that make your symptoms worse  These may include spicy foods, fruit juices, alcohol, caffeine, chocolate, and mint       · Eat several small meals during the day  Small meals give your stomach less food to digest     · Avoid lying down and bending forward after you eat  Do not eat meals 2 to 3 hours before bedtime  This decreases your risk for reflux  · Maintain a healthy weight  If you are overweight, weight loss may help relieve your symptoms  · Sleep with your head elevated  at least 6 inches  · Do not smoke  Smoking can increase your symptoms of heartburn  © Copyright [employer] 2021 Information is for End User's use only and may not be sold, redistributed or otherwise used for commercial purposes  All illustrations and images included in CareNotes® are the copyrighted property of A D A M , Inc  or [facility] [employer]   The above information is an  only  It is not intended as medical advice for individual conditions or treatments  Talk to your doctor, nurse or pharmacist before following any medical regimen to see if it is safe and effective for you  Upper Endoscopy   WHAT YOU NEED TO KNOW:   An upper endoscopy is also called an upper gastrointestinal (GI) endoscopy, or an esophagogastroduodenoscopy (EGD)  You may feel bloated, gassy, or have some abdominal discomfort after your procedure  Your throat may be sore for 24 to 36 hours  You may burp or pass gas from air that is still inside your body  DISCHARGE INSTRUCTIONS:   Call 911 for any of the following:   · You have sudden chest pain or trouble breathing  Seek care immediately if:   · You feel dizzy or faint  · You have trouble swallowing  · Your bowel movements are very dark or black  · Your abdomen is hard and firm and you have severe pain  · You vomit blood  Contact your healthcare provider if:   · You feel full or bloated and cannot burp or pass gas  · You have not had a bowel movement for 3 days after your procedure  · You have neck pain  · You have a fever or chills  · You have nausea or are vomiting      · You have a rash or hives  · You have questions or concerns about your endoscopy  Relieve a sore throat:  Suck on throat lozenges or crushed ice  Gargle with a small amount of warm salt water  Mix 1 teaspoon of salt and 1 cup of warm water to make salt water  Relieve gas and discomfort from bloating:  Lie on your right side with a heating pad on your abdomen  Take short walks to help pass gas  Eat small meals until bloating is relieved  Rest after your procedure: You have been given medicine to relax you  Do not  drive or make important decisions until the day after your procedure  Return to your normal activity as directed  You can usually return to work the day after your procedure  Follow up with your healthcare provider as directed:  Write down your questions so you remember to ask them during your visits  © 2017 [address] [employer] [employer] is for End User's use only and may not be sold, redistributed or otherwise used for commercial purposes  All illustrations and images included in CareNotes® are the copyrighted property of A D A M , Inc  or [employer]  The above information is an  only  It is not intended as medical advice for individual conditions or treatments  Talk to your doctor, nurse or pharmacist before following any medical regimen to see if it is safe and effective for you  Helicobacter Pylori   WHAT YOU NEED TO KNOW:   What is a Helicobacter pylori (H  pylori) infection? H  pylori bacteria infect the lining of the stomach and upper intestine  People are usually infected with the bacteria as children but do not have symptoms until they are adults  What are the signs and symptoms of an H  pylori infection? Most people who are infected with H  pylori never have symptoms  If you do, your symptoms may come and go and last for minutes or hours  You may feel better for a short time after you eat food or take medicine   You may have any of the following:  · Dull or burning pain in your stomach or throat    · Nausea, vomiting, bloating, or burping    · Loss of appetite or weight loss    · Pain at night or with an empty stomach    How is an H  pylori infection diagnosed? · A urea breath test  means you will swallow pudding, liquid, or a capsule that contains a chemical  Then you will breathe into a container  Your breath sample will be tested for a reaction to the chemical that confirms H  pylori infection  · Bowel movement or blood samples  may be tested for infection  · Endoscopy  is a procedure that may be done if your healthcare provider thinks you have an ulcer  He or she will use a scope to see the inside of your stomach  A scope is a soft, flexible tube with a light and tiny camera on the end  It is passed down your throat and into your stomach  Samples of your stomach tissue may be removed and tested for H  pylori infection  How is an H  pylori infection treated? It is important to treat the infection  H  pylori may lead to changes in the cells of your esophagus or stomach  The cells are changed into intestine cells  This is a condition called intestinal metaplasia that increases your risk for cancer of the esophagus or stomach  The following may be used to treat an infection:  · Antibiotics  help kill the bacteria  You may need to take this medicine for 10 to 14 days  Your healthcare provider will prescribe at least 2 antibiotics at the same time  · Antiulcer medicines  help decrease the amount of acid that is normally made by the stomach  These help relieve pain and heal or prevent ulcers  · Bismuth  is a liquid or tablet that may be used to decrease heartburn, upset stomach, or diarrhea  It may also decrease swelling in your stomach and help kill the infection if other medicines do not work  It also protects ulcers from stomach acid so they can heal     How can I prevent or manage an H  pylori infection? · Wash your hands often    Infection can happen through contact with infected bowel movement, vomit, or saliva  Use soap and warm water  Use an alcohol-based gel if soap and water are not available  Clean your hands before you eat and after you use the bathroom  Clean your hands after you change a baby's diaper  · Handle food properly  Infection can happen if you drink water that is not clean or eat food that is not washed or cooked properly  Rinse food well before you cook or eat it  Meg Keep food all the way through  Proper handling will help kill any bacteria that may be on your food  · Drink clean water from a safe source  Only drink water that has been filtered or purified  · Ask about NSAIDs  NSAIDs can cause stomach bleeding and kidney problems if not taken correctly  Your healthcare provider may tell you to avoid these medicines because they can make your symptoms worse  · Do not smoke  Nicotine and other chemicals in cigarettes and cigars can worsen your symptoms  Ask your provider for information if you currently smoke and need help to quit  E-cigarettes or smokeless tobacco still contain nicotine  Talk to your provider before you use these products  · Do not drink alcohol  Alcohol may worsen your symptoms of heartburn  Alcohol also increases the risk for cancer of the esophagus or stomach  Ask your provider for information if you currently drink alcohol and need help to quit  When should I seek immediate care? · You have bloody bowel movements, bloody vomit, or vomit that looks like coffee grounds  · You have sudden, sharp stomach pain that does not go away or spreads to your back  When should I call my doctor? · Your symptoms do not improve with treatment  · You feel full after eating only a small amount of food  · You lose weight without trying  · You have questions or concerns about your condition or care  CARE AGREEMENT:   You have the right to help plan your care   Learn about your health condition and how it may be treated  Discuss treatment options with your healthcare providers to decide what care you want to receive  You always have the right to refuse treatment  The above information is an  only  It is not intended as medical advice for individual conditions or treatments  Talk to your doctor, nurse or pharmacist before following any medical regimen to see if it is safe and effective for you  © Copyright [employer] 2021 Information is for End User's use only and may not be sold, redistributed or otherwise used for commercial purposes   All illustrations and images included in CareNotes® are the copyrighted property of A D A M , Inc  or [address] [place] [address]

## 2021-08-18 NOTE — INTERVAL H&P NOTE
H&P reviewed  After examining the patient I find no changes in the patients condition since the H&P had been written      Vitals:    08/18/21 0754   BP: 105/51   Pulse: 68   Resp: 16   Temp: 97 7 °F (36 5 °C)   SpO2: 100%

## 2021-08-18 NOTE — ANESTHESIA POSTPROCEDURE EVALUATION
Post-Op Assessment Note    CV Status:  Stable  Pain Score: 0    Pain management: adequate     Mental Status:  Sleepy   Hydration Status:  Euvolemic   PONV Controlled:  Controlled   Airway Patency:  Patent      Post Op Vitals Reviewed: Yes      Staff: CRNA         No complications documented      BP (!) 88/52 (08/18/21 0822)    Temp 97 6 °F (36 4 °C) (08/18/21 0822)    Pulse 57 (08/18/21 0822)   Resp 20 (08/18/21 0822)    SpO2 96 % (08/18/21 0822)

## 2021-08-18 NOTE — ANESTHESIA PREPROCEDURE EVALUATION
Procedure:  EGD    Relevant Problems   NEURO/PSYCH   (+) Anxiety      Results for Armond Marin (MRN [id number]) as of 8/18/2021 08:06   Ref  Range 8/18/2021 08:00   PREGNANCY TEST URINE Latest Ref Range: Negative  Negative     Physical Exam    Airway    Mallampati score: II  TM Distance: >3 FB  Neck ROM: full     Dental   No notable dental hx     Cardiovascular      Pulmonary      Other Findings        Anesthesia Plan  ASA Score- 2     Anesthesia Type- IV sedation with anesthesia with ASA Monitors  Additional Monitors:   Airway Plan:           Plan Factors-Exercise tolerance (METS): >4 METS  Chart reviewed  Existing labs reviewed  Patient summary reviewed  Patient is not a current smoker  Patient did not smoke on day of surgery  Induction- intravenous  Postoperative Plan-     Informed Consent- Anesthetic plan and risks discussed with patient  I personally reviewed this patient with the CRNA  Discussed and agreed on the Anesthesia Plan with the CRNA  Maurice Castaneda

## 2022-04-01 ENCOUNTER — APPOINTMENT (EMERGENCY)
Dept: RADIOLOGY | Facility: HOSPITAL | Age: 52
End: 2022-04-01
Payer: COMMERCIAL

## 2022-04-01 ENCOUNTER — HOSPITAL ENCOUNTER (EMERGENCY)
Facility: HOSPITAL | Age: 52
Discharge: HOME | End: 2022-04-01
Attending: EMERGENCY MEDICINE
Payer: COMMERCIAL

## 2022-04-01 VITALS
RESPIRATION RATE: 18 BRPM | HEART RATE: 68 BPM | TEMPERATURE: 97.6 F | HEIGHT: 66 IN | WEIGHT: 132 LBS | OXYGEN SATURATION: 99 % | BODY MASS INDEX: 21.21 KG/M2 | DIASTOLIC BLOOD PRESSURE: 57 MMHG | SYSTOLIC BLOOD PRESSURE: 107 MMHG

## 2022-04-01 DIAGNOSIS — F41.0 ANXIETY ATTACK: Primary | ICD-10-CM

## 2022-04-01 LAB
ALBUMIN SERPL BCP-MCNC: 4.4 G/DL (ref 3.5–5)
ALP SERPL-CCNC: 62 U/L (ref 34–104)
ALT SERPL W P-5'-P-CCNC: 11 U/L (ref 7–52)
ANION GAP SERPL CALCULATED.3IONS-SCNC: 7 MMOL/L (ref 4–13)
AST SERPL W P-5'-P-CCNC: 19 U/L (ref 13–39)
BASOPHILS # BLD AUTO: 0.03 THOUSANDS/ΜL (ref 0–0.1)
BASOPHILS NFR BLD AUTO: 1 % (ref 0–1)
BILIRUB SERPL-MCNC: 0.43 MG/DL (ref 0.2–1)
BUN SERPL-MCNC: 21 MG/DL (ref 5–25)
CALCIUM SERPL-MCNC: 9.6 MG/DL (ref 8.4–10.2)
CARDIAC TROPONIN I PNL SERPL HS: <2 NG/L
CHLORIDE SERPL-SCNC: 104 MMOL/L (ref 96–108)
CO2 SERPL-SCNC: 27 MMOL/L (ref 21–32)
CREAT SERPL-MCNC: 0.87 MG/DL (ref 0.6–1.3)
EOSINOPHIL # BLD AUTO: 0.06 THOUSAND/ΜL (ref 0–0.61)
EOSINOPHIL NFR BLD AUTO: 1 % (ref 0–6)
ERYTHROCYTE [DISTWIDTH] IN BLOOD BY AUTOMATED COUNT: 12.5 % (ref 11.6–15.1)
GFR SERPL CREATININE-BSD FRML MDRD: 77 ML/MIN/1.73SQ M
GLUCOSE SERPL-MCNC: 87 MG/DL (ref 65–140)
HCT VFR BLD AUTO: 44.3 % (ref 34.8–46.1)
HGB BLD-MCNC: 15 G/DL (ref 11.5–15.4)
IMM GRANULOCYTES # BLD AUTO: 0.01 THOUSAND/UL (ref 0–0.2)
IMM GRANULOCYTES NFR BLD AUTO: 0 % (ref 0–2)
LYMPHOCYTES # BLD AUTO: 1.97 THOUSANDS/ΜL (ref 0.6–4.47)
LYMPHOCYTES NFR BLD AUTO: 31 % (ref 14–44)
MCH RBC QN AUTO: 30.2 PG (ref 26.8–34.3)
MCHC RBC AUTO-ENTMCNC: 33.9 G/DL (ref 31.4–37.4)
MCV RBC AUTO: 89 FL (ref 82–98)
MONOCYTES # BLD AUTO: 0.64 THOUSAND/ΜL (ref 0.17–1.22)
MONOCYTES NFR BLD AUTO: 10 % (ref 4–12)
NEUTROPHILS # BLD AUTO: 3.56 THOUSANDS/ΜL (ref 1.85–7.62)
NEUTS SEG NFR BLD AUTO: 57 % (ref 43–75)
NRBC BLD AUTO-RTO: 0 /100 WBCS
PLATELET # BLD AUTO: 246 THOUSANDS/UL (ref 149–390)
PMV BLD AUTO: 10.1 FL (ref 8.9–12.7)
POTASSIUM SERPL-SCNC: 3.6 MMOL/L (ref 3.5–5.3)
PROT SERPL-MCNC: 6.8 G/DL (ref 6.4–8.4)
RBC # BLD AUTO: 4.96 MILLION/UL (ref 3.81–5.12)
SODIUM SERPL-SCNC: 138 MMOL/L (ref 135–147)
TSH SERPL DL<=0.05 MIU/L-ACNC: 1.24 UIU/ML (ref 0.45–5.33)
WBC # BLD AUTO: 6.27 THOUSAND/UL (ref 4.31–10.16)

## 2022-04-01 PROCEDURE — 80053 COMPREHEN METABOLIC PANEL: CPT | Performed by: PHYSICIAN ASSISTANT

## 2022-04-01 PROCEDURE — 36415 COLL VENOUS BLD VENIPUNCTURE: CPT | Performed by: PHYSICIAN ASSISTANT

## 2022-04-01 PROCEDURE — 96365 THER/PROPH/DIAG IV INF INIT: CPT

## 2022-04-01 PROCEDURE — 99285 EMERGENCY DEPT VISIT HI MDM: CPT

## 2022-04-01 PROCEDURE — 84484 ASSAY OF TROPONIN QUANT: CPT | Performed by: PHYSICIAN ASSISTANT

## 2022-04-01 PROCEDURE — 99285 EMERGENCY DEPT VISIT HI MDM: CPT | Performed by: PHYSICIAN ASSISTANT

## 2022-04-01 PROCEDURE — 71045 X-RAY EXAM CHEST 1 VIEW: CPT

## 2022-04-01 PROCEDURE — 84443 ASSAY THYROID STIM HORMONE: CPT | Performed by: PHYSICIAN ASSISTANT

## 2022-04-01 PROCEDURE — 85025 COMPLETE CBC W/AUTO DIFF WBC: CPT | Performed by: PHYSICIAN ASSISTANT

## 2022-04-01 RX ORDER — HYDROXYZINE HYDROCHLORIDE 25 MG/1
25 TABLET, FILM COATED ORAL EVERY 6 HOURS PRN
Qty: 12 TABLET | Refills: 0 | Status: SHIPPED | OUTPATIENT
Start: 2022-04-01 | End: 2022-04-04

## 2022-04-01 RX ADMIN — SODIUM CHLORIDE, SODIUM LACTATE, POTASSIUM CHLORIDE, AND CALCIUM CHLORIDE 1000 ML: .6; .31; .03; .02 INJECTION, SOLUTION INTRAVENOUS at 15:32

## 2022-04-01 NOTE — DISCHARGE INSTRUCTIONS
Please return to the emergency department for worsening symptoms including chest pain, shortness of breath, dizziness, lightheadedness, fever greater than 103, severe pain, inability to walk, fainting episodes, etc  Please follow-up with your family practice provider as soon as possible  I have sent a medication as needed for your anxiety to your pharmacy  Please take this as needed  Please follow-up with a primary care provider and speak with them about your anxiety  They will be able to further  you  Please use the resources and warm lines you were given if needed  The warm lines are helpful if you are experiencing anxiety and need to speak with somebody immediately

## 2022-04-02 NOTE — ED PROVIDER NOTES
History  Chief Complaint   Patient presents with    Rapid Heart Rate     Pt "I am just having anxiety from work  I have been tracey burnt out but I am about to retire  I just cant stop crying  I feel overwhelemed  I tracey feel Abelardo Canas in my chest"      This is a 56-year-old female with past medical history significant for anxiety presenting to the emergency department today with palpitations and anxiety  She notes that she feels like her heart is racing  She has a 6  and notes she gets anxiety when she is at work  She feels burnt out  She denies any chest pain or shortness of breath  She does feel the occasional palpitation it is not every day  She denies any fever, chills, sore throat, ear pain, or neck pain  No nausea, vomiting, diarrhea, constipation, or urinary symptoms  She denies any suicidal or homicidal ideations  No hallucinations  The patient does use medical marijuana but denies any use of anxiolytics or antidepressants  The patient denies other complaints at this time  History provided by:  Patient   used: No    Rapid Heart Rate  Palpitations quality:  Unable to specify  Onset quality:  Sudden  Timing:  Sporadic  Progression:  Waxing and waning  Chronicity:  Recurrent  Context: anxiety    Context: not caffeine, not dehydration and not illicit drugs    Relieved by:  Nothing  Worsened by:  Nothing  Ineffective treatments:  None tried  Associated symptoms: chest pressure and malaise/fatigue    Associated symptoms: no back pain, no chest pain, no cough, no diaphoresis, no dizziness, no hemoptysis, no leg pain, no lower extremity edema, no nausea, no near-syncope, no numbness, no orthopnea, no shortness of breath, no syncope, no vomiting and no weakness        Prior to Admission Medications   Prescriptions Last Dose Informant Patient Reported? Taking?    Ascorbic Acid (VITAMIN C ADULT GUMMIES PO)   Yes Yes   Sig: Take by mouth   pantoprazole (PROTONIX) 40 mg tablet Not Taking at Unknown time  No No   Sig: Take 1 tablet (40 mg total) by mouth daily   Patient not taking: Reported on 2022       Facility-Administered Medications: None       Past Medical History:   Diagnosis Date    Anxiety     Seasonal allergies        No past surgical history on file  No family history on file  I have reviewed and agree with the history as documented  E-Cigarette/Vaping    E-Cigarette Use Never User      E-Cigarette/Vaping Substances    THC Yes     CBD Yes      Social History     Tobacco Use    Smoking status: Former Smoker     Quit date: 2021     Years since quittin 0    Smokeless tobacco: Never Used   Vaping Use    Vaping Use: Never used   Substance Use Topics    Alcohol use: Not Currently    Drug use: Yes     Types: Marijuana     Comment: medical marijuana: 23       Review of Systems   Constitutional: Positive for fatigue and malaise/fatigue  Negative for appetite change, chills, diaphoresis and fever  Eyes: Negative for visual disturbance  Respiratory: Negative for cough, hemoptysis, chest tightness, shortness of breath and wheezing  Cardiovascular: Positive for palpitations  Negative for chest pain, orthopnea, leg swelling, syncope and near-syncope  Gastrointestinal: Negative for abdominal pain, constipation, diarrhea, nausea and vomiting  Genitourinary: Negative for dysuria  Musculoskeletal: Negative for back pain, neck pain and neck stiffness  Skin: Negative for rash and wound  Neurological: Negative for dizziness, seizures, syncope, weakness, light-headedness, numbness and headaches  Psychiatric/Behavioral: Negative for confusion  The patient is nervous/anxious  All other systems reviewed and are negative  Physical Exam  Physical Exam  Vitals and nursing note reviewed  Constitutional:       General: She is not in acute distress  Appearance: Normal appearance  She is normal weight   She is not ill-appearing, toxic-appearing or diaphoretic  Comments: Tearful  Appears able to care for herself   HENT:      Head: Normocephalic and atraumatic  Nose: Nose normal  No congestion or rhinorrhea  Mouth/Throat:      Mouth: Mucous membranes are moist       Pharynx: No oropharyngeal exudate or posterior oropharyngeal erythema  Eyes:      General: No scleral icterus  Right eye: No discharge  Left eye: No discharge  Extraocular Movements: Extraocular movements intact  Conjunctiva/sclera: Conjunctivae normal    Cardiovascular:      Rate and Rhythm: Normal rate and regular rhythm  Pulses: Normal pulses  Heart sounds: Normal heart sounds  No murmur heard  No friction rub  No gallop  Pulmonary:      Effort: Pulmonary effort is normal  No respiratory distress  Breath sounds: Normal breath sounds  No stridor  No wheezing, rhonchi or rales  Chest:      Chest wall: No tenderness  Abdominal:      General: Abdomen is flat  There is no distension  Palpations: Abdomen is soft  Tenderness: There is no abdominal tenderness  There is no right CVA tenderness, left CVA tenderness, guarding or rebound  Musculoskeletal:         General: Normal range of motion  Cervical back: Normal range of motion  No tenderness  Right lower leg: No edema  Left lower leg: No edema  Skin:     General: Skin is warm and dry  Capillary Refill: Capillary refill takes less than 2 seconds  Coloration: Skin is not jaundiced or pale  Neurological:      General: No focal deficit present  Mental Status: She is alert and oriented to person, place, and time  Mental status is at baseline        Comments: 5/5 strength to bilateral upper and lower extremities  Normal sensation to bilateral upper and lower extremities   Psychiatric:         Mood and Affect: Mood normal          Behavior: Behavior normal       Comments: Patient notes anxiety without any suicidal or homicidal ideations  No hallucinations  Appears able to care for herself         Vital Signs  ED Triage Vitals [04/01/22 1508]   Temperature Pulse Respirations Blood Pressure SpO2   97 6 °F (36 4 °C) (!) 106 22 144/89 100 %      Temp Source Heart Rate Source Patient Position - Orthostatic VS BP Location FiO2 (%)   Oral Monitor Sitting Left arm --      Pain Score       No Pain           Vitals:    04/01/22 1508 04/01/22 1607 04/01/22 1626   BP: 144/89 116/75 107/57   Pulse: (!) 106 68 68   Patient Position - Orthostatic VS: Sitting Sitting Sitting         Visual Acuity      ED Medications  Medications   lactated ringers bolus 1,000 mL (0 mL Intravenous Stopped 4/1/22 1626)       Diagnostic Studies  Results Reviewed     Procedure Component Value Units Date/Time    TSH [[id number]]  (Normal) Collected: 04/01/22 1527    Lab Status: Final result Specimen: Blood from Arm, Left Updated: 04/01/22 1634     TSH 3RD GENERATON 1 236 uIU/mL     Narrative:      Patients undergoing fluorescein dye angiography may retain small amounts of fluorescein in the body for 48-72 hours post procedure  Samples containing fluorescein can produce falsely depressed TSH values  If the patient had this procedure,a specimen should be resubmitted post fluorescein clearance        HS Troponin 0hr (reflex protocol) [[id number]]  (Normal) Collected: 04/01/22 1527    Lab Status: Final result Specimen: Blood from Arm, Left Updated: 04/01/22 1558     hs TnI 0hr <2 ng/L     Comprehensive metabolic panel [[id number]] Collected: 04/01/22 1527    Lab Status: Final result Specimen: Blood from Arm, Left Updated: 04/01/22 1554     Sodium 138 mmol/L      Potassium 3 6 mmol/L      Chloride 104 mmol/L      CO2 27 mmol/L      ANION GAP 7 mmol/L      BUN 21 mg/dL      Creatinine 0 87 mg/dL      Glucose 87 mg/dL      Calcium 9 6 mg/dL      AST 19 U/L      ALT 11 U/L      Alkaline Phosphatase 62 U/L      Total Protein 6 8 g/dL      Albumin 4 4 g/dL      Total Bilirubin 0 43 mg/dL eGFR 77 ml/min/1 73sq m     Narrative:      [place] guidelines for Chronic Kidney Disease (CKD):     Stage 1 with normal or high GFR (GFR > 90 mL/min/1 73 square meters)    Stage 2 Mild CKD (GFR = 60-89 mL/min/1 73 square meters)    Stage 3A Moderate CKD (GFR = 45-59 mL/min/1 73 square meters)    Stage 3B Moderate CKD (GFR = 30-44 mL/min/1 73 square meters)    Stage 4 Severe CKD (GFR = 15-29 mL/min/1 73 square meters)    Stage 5 End Stage CKD (GFR <15 mL/min/1 73 square meters)  Note: GFR calculation is accurate only with a steady state creatinine    CBC and differential [[id number]] Collected: 04/01/22 1527    Lab Status: Final result Specimen: Blood from Arm, Left Updated: 04/01/22 1535     WBC 6 27 Thousand/uL      RBC 4 96 Million/uL      Hemoglobin 15 0 g/dL      Hematocrit 44 3 %      MCV 89 fL      MCH 30 2 pg      MCHC 33 9 g/dL      RDW 12 5 %      MPV 10 1 fL      Platelets 917 Thousands/uL      nRBC 0 /100 WBCs      Neutrophils Relative 57 %      Immat GRANS % 0 %      Lymphocytes Relative 31 %      Monocytes Relative 10 %      Eosinophils Relative 1 %      Basophils Relative 1 %      Neutrophils Absolute 3 56 Thousands/µL      Immature Grans Absolute 0 01 Thousand/uL      Lymphocytes Absolute 1 97 Thousands/µL      Monocytes Absolute 0 64 Thousand/µL      Eosinophils Absolute 0 06 Thousand/µL      Basophils Absolute 0 03 Thousands/µL                  XR chest 1 view portable   Final Result by Dillan García MD (04/01 1612)      No acute cardiopulmonary disease  Workstation performed: [id number]                    Procedures  ECG 12 Lead Documentation Only    Date/Time: 4/1/2022 3:16 PM  Performed by: Lashaun Leyva PA-C  Authorized by: Lashaun Leyva PA-C     Indications / Diagnosis:   Anxiety; Palpitations  Patient location:  ED  Interpretation:     Interpretation: normal    Rate:     ECG rate:  77    ECG rate assessment: normal Rhythm:     Rhythm: sinus rhythm    Ectopy:     Ectopy: none    QRS:     QRS axis:  Normal  Conduction:     Conduction: normal    ST segments:     ST segments:  Normal  T waves:     T waves: normal    Comments:      Normal sinus rhythm with a rate of 77 without any ST segment changes or signs of ischemia  Normal axis  No ectopy             ED Course  ED Course as of 04/02/22 1533   Fri Apr 01, 2022   1548 Offered anxiolytic medications; patient declines at this time  Will continue to monitor  MDM  Number of Diagnoses or Management Options  Anxiety attack: new and requires workup  Diagnosis management comments: This is a 60-year-old female presenting to the emergency department today for an anxiety attack  Notes she was at work and began feeling anxious  History of the same  She notes she wants to retire and she needs somebody to write her out of work  On physical exam, the patient is tearful and anxious  She denies any suicidal or homicidal ideations  No hallucinations  Mildly tachycardic on presentation but this tachycardia resolved  The patient was given intravenous fluids here in the emergency department  Lab workup was unrevealing  The patient was given resources for [facility], psychiatry resources, and warm lines that she can contact if she is feeling anxious, suicidal, or homicidal   I spoke with crisis worker Kaila about the patient  Also gave the patient information to follow up with the PCP such that she can have someone to speak with about potentially retiring  The patient appears able to care for herself  EKG shows normal sinus rhythm without any ST segment changes or signs of ischemia  No acute cardiopulmonary disease on chest x-ray  The patient is stable for discharge at this time  Hydroxyzine sent to the patient's pharmacy for as needed anxiolysis  Strict return precautions were given    Recommend PCP follow-up as soon as possible  The patient verifies her understanding and agrees to the plan at this time  All questions answered to the patient's satisfaction  Amount and/or Complexity of Data Reviewed  Clinical lab tests: ordered and reviewed  Tests in the radiology section of CPT®: ordered and reviewed  Independent visualization of images, tracings, or specimens: yes (EKG  CXR)    Patient Progress  Patient progress: stable      Disposition  Final diagnoses:   Anxiety attack     Time reflects when diagnosis was documented in both MDM as applicable and the Disposition within this note     Time User Action Codes Description Comment    4/1/2022  4:17 PM Charbel Hartman Add [F41 0] Anxiety attack       ED Disposition     ED Disposition Condition Date/Time Comment    Discharge Stable Fri Apr 1, 2022  4:17  [address] discharge to home/self care              Follow-up Information     Follow up With Specialties Details Why Contact Info Additional [id number] Emergency Department Emergency Medicine Go to  If symptoms worsen [address] [facility],[address] [id number]  [address] [facility] Emergency Department, [address], [address] [facility] Medicine Schedule an appointment as soon as possible for a visit   [address] [id number]  [id number]  , [place], [place], [address]          Discharge Medication List as of 4/1/2022  4:20 PM      START taking these medications    Details   hydrOXYzine HCL (ATARAX) 25 mg tablet Take 1 tablet (25 mg total) by mouth every 6 (six) hours as needed for anxiety for up to 3 days, Starting Fri 4/1/2022, Until Mon 4/4/2022 at 2359, Normal         CONTINUE these medications which have NOT CHANGED    Details   Ascorbic Acid (VITAMIN C ADULT GUMMIES PO) Take by mouth, Historical Med      pantoprazole (PROTONIX) 40 mg tablet Take 1 tablet (40 mg total) by mouth daily, Starting Thu 8/5/2021, Normal             No discharge procedures on file      PDMP Review     None          ED Provider  Electronically Signed by           Ko Villa PA-C  04/02/22 0558

## 2022-04-04 ENCOUNTER — OFFICE VISIT (OUTPATIENT)
Dept: FAMILY MEDICINE CLINIC | Facility: CLINIC | Age: 52
End: 2022-04-04
Payer: COMMERCIAL

## 2022-04-04 VITALS
SYSTOLIC BLOOD PRESSURE: 128 MMHG | OXYGEN SATURATION: 98 % | TEMPERATURE: 98 F | WEIGHT: 138.56 LBS | DIASTOLIC BLOOD PRESSURE: 78 MMHG | RESPIRATION RATE: 20 BRPM | HEART RATE: 98 BPM | BODY MASS INDEX: 22.27 KG/M2 | HEIGHT: 66 IN

## 2022-04-04 DIAGNOSIS — F41.9 ANXIETY: Primary | ICD-10-CM

## 2022-04-04 PROCEDURE — 99203 OFFICE O/P NEW LOW 30 MIN: CPT | Performed by: FAMILY MEDICINE

## 2022-04-04 NOTE — LETTER
April 4, 2022     Patient: Criss Maldonado   YOB: 1970   Date of Visit: 4/4/2022       To Whom it May Concern:      Brice Akhtar is under my professional care  She was seen in my office on 4/4/2022  Please excuse her absence from work ON 4/4/2022 for the next 2 weeks  She may not return to work until reevaluation by PCP  If you have any questions or concerns, please don't hesitate to call           Sincerely,          Sujit Clark MD

## 2022-04-04 NOTE — PROGRESS NOTES
Assessment/Plan:     Diagnoses and all orders for this visit:    Anxiety    -Recommend patient begin behavioral counseling     -Patient declines long acting or short acting anxiety meds at this time and states she would like to continue with medical marijuana    -Patient requesting note for stress leave from work at this time as she feels she can no function at her job  Note written to excuse patient from work for the next 2 weeks  Will reevaluate at next visit  Return in about 2 weeks (around 4/18/2022) for Recheck Anxiety  The patient indicates understanding of these issues and agrees with the plan  Subjective:      Patient ID: Roya Smith is a 49 y o  female  HPI   Patient with 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 and left retinal detachment s/p laser treatment presents for ED follow up visit and to establish care  Patient presented to the ED last Friday complaining severe anxiety  Labs were done including TSH, Troponin, CBC and CMP and were normal  CXR was also done and was normal  Patient declined treatment for anxiety while in ED  She was prescribed Hydroxyzine for anxiety on discharge however states she has not taken this medication as she does not like taking medications  Patient reports over the past 1 year she has been experiencing significant anxiety which she feels is due to her job  Patient states for the past 20 years she has been working as a  and states that the 1 hour commute to and from [place], [place] has been very difficult  She reports being on edge while she is at work as the 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  She reports being easily agitated and very irritable and is worried she will lash out at a co-worker or a student  Patient 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 and states in the past she has only missed 1-2 school days a year   She reports financial issues are also contributing to her anxiety  She states she has a medical marijuana card which she obtained through a provider online and states she has been using medical marijuana almost daily which does temporarily improve her anxiety  Patient also states doing martial arts or other physical activities also helps to improve her anxiety  She reports she has a good support system comprised of friends and family members  Patient denies loss of appetite  Reports poor fragmented sleep over the past 2 months (4-5 hours nightly)  Denies self-injury, suicidal or homicidal ideations  Patient is amenable to behavioral counseling  Family Hx  -Dad with CAD s/p quadruple bypass in his 52's  -Mother [id number]    -Maternal grandmother had MDD    Social Hx   -Does not drink alcohol  -Quit smoking 1 year ago without use of any meds  Began smoking at 18yo (1-2 PPD)  The following portions of the patient's history were reviewed and updated as appropriate: allergies, current medications, past family history, past medical history, past social history, past surgical history and problem list     Review of Systems   Constitutional: Negative for appetite change, chills, fatigue, fever and unexpected weight change  Eyes: Negative for visual disturbance  Respiratory: Negative for cough  Cardiovascular: Negative for chest pain and palpitations  Gastrointestinal: Negative for abdominal pain, constipation, diarrhea, nausea and vomiting  Genitourinary: Negative for difficulty urinating and dysuria  Neurological: Negative for dizziness and headaches  Psychiatric/Behavioral: Positive for agitation, decreased concentration and sleep disturbance  Negative for confusion, hallucinations and self-injury  The patient is nervous/anxious            Objective:  /78 (BP Location: Left arm, Patient Position: Sitting, Cuff Size: Standard)   Pulse 98   Temp 98 °F (36 7 °C) (Temporal)   Resp 20   Ht 5' 6" (1 676 m)   Wt 62 9 kg (138 lb 9 oz)   LMP 03/20/2022   SpO2 98%   BMI 22 36 kg/m²          Physical Exam  Vitals reviewed  Constitutional:       General: She is not in acute distress  Appearance: Normal appearance  She is not ill-appearing, toxic-appearing or diaphoretic  HENT:      Right Ear: Tympanic membrane, ear canal and external ear normal  There is no impacted cerumen  Left Ear: Tympanic membrane, ear canal and external ear normal  There is no impacted cerumen  Nose: Nose normal       Mouth/Throat:      Mouth: Mucous membranes are moist       Pharynx: Oropharynx is clear  No oropharyngeal exudate or posterior oropharyngeal erythema  Eyes:      Extraocular Movements: Extraocular movements intact  Conjunctiva/sclera: Conjunctivae normal    Cardiovascular:      Rate and Rhythm: Normal rate and regular rhythm  Pulses: Normal pulses  Heart sounds: Normal heart sounds  Pulmonary:      Effort: Pulmonary effort is normal       Breath sounds: Normal breath sounds  Abdominal:      General: Bowel sounds are normal  There is no distension  Palpations: Abdomen is soft  Tenderness: There is no abdominal tenderness  Musculoskeletal:      Cervical back: Normal range of motion and neck supple  No rigidity or tenderness  Right lower leg: No edema  Left lower leg: No edema  Lymphadenopathy:      Cervical: No cervical adenopathy  Neurological:      General: No focal deficit present  Mental Status: She is alert and oriented to person, place, and time     Psychiatric:      Comments: -anxious  -tearful

## 2022-04-19 ENCOUNTER — OFFICE VISIT (OUTPATIENT)
Dept: FAMILY MEDICINE CLINIC | Facility: CLINIC | Age: 52
End: 2022-04-19
Payer: COMMERCIAL

## 2022-04-19 VITALS
HEIGHT: 66 IN | TEMPERATURE: 98 F | SYSTOLIC BLOOD PRESSURE: 120 MMHG | DIASTOLIC BLOOD PRESSURE: 76 MMHG | HEART RATE: 76 BPM | WEIGHT: 135 LBS | RESPIRATION RATE: 22 BRPM | OXYGEN SATURATION: 98 % | BODY MASS INDEX: 21.69 KG/M2

## 2022-04-19 DIAGNOSIS — F32.A DEPRESSION, UNSPECIFIED DEPRESSION TYPE: Primary | ICD-10-CM

## 2022-04-19 DIAGNOSIS — F41.9 ANXIETY: ICD-10-CM

## 2022-04-19 DIAGNOSIS — R53.82 CHRONIC FATIGUE: ICD-10-CM

## 2022-04-19 PROCEDURE — 3008F BODY MASS INDEX DOCD: CPT

## 2022-04-19 PROCEDURE — 1036F TOBACCO NON-USER: CPT

## 2022-04-19 PROCEDURE — 99213 OFFICE O/P EST LOW 20 MIN: CPT

## 2022-04-19 PROCEDURE — 3725F SCREEN DEPRESSION PERFORMED: CPT

## 2022-04-19 NOTE — ASSESSMENT & PLAN NOTE
- Pt reports hx of chronic fatigue syndrome  - Recent lab work did not demonstrate any other possible cause for her symptoms  - Currently not experiencing fatigue  - Uses coffee for energy  - Pt reports also using home made vegetable + fruit smoothies  - Chart review did not reveal any previous workup    Plan:  - Secure prior medical records   - Consider new work up for [address] with Labs at next office visit  - Consider depression/anxiety/stress from work as major component  - Reassess at next office visit

## 2022-04-19 NOTE — PATIENT INSTRUCTIONS
Anxiety, Ambulatory Care   GENERAL INFORMATION:   Anxiety  is a condition that causes you to feel excessive worry, uneasiness, or fear  Family or work stress, smoking, caffeine, and alcohol can increase your risk for anxiety  Certain medicines or health conditions can also increase your risk  Anxiety may begin gradually and can become a long-term condition if it is not managed or treated  Common symptoms include the following:   · Fatigue or muscle tightness     · Shaking, restlessness, or irritability     · Problems focusing     · Trouble sleeping     · Feeling jumpy, easily startled, or dizzy     · Rapid heartbeat or shortness of breath  Seek immediate care for the following symptoms:   · Chest pain, tightness, or heaviness that may spread to your shoulders, arms, jaw, neck, or back    · Feeling like hurting yourself or someone else    · Dizziness or feeling lightheaded or faint  Treatment for anxiety  may include medicines to help you feel calm and relaxed, and decrease your symptoms  Healthcare providers will treat any medical conditions that may be causing your symptoms  Manage anxiety:   · Go to counseling as directed  Cognitive behavioral therapy can help you understand and change how you react to events that trigger your symptoms  · Find ways to manage your symptoms  Activities such as exercise, meditation, or listening to music can help you relax  · Practice deep breathing  Breathing can change how your body reacts to stress  Focus on taking slow, deep breaths several times a day, or during an anxiety attack  Breathe in through your nose, and out through your mouth  · Avoid caffeine  Caffeine can make your symptoms worse  Avoid foods or drinks that are meant to increase your energy level  · Limit or avoid alcohol  Ask your healthcare provider if alcohol is safe for you  You may not be able to drink alcohol if you take certain anxiety or depression medicines   Limit alcohol to 1 drink per day if you are a woman  Limit alcohol to 2 drinks per day if you are a man  A drink of alcohol is 12 ounces of beer, 5 ounces of wine, or 1½ ounces of liquor  Follow up with your healthcare provider as directed:  Write down your questions so you remember to ask them during your visits  CARE AGREEMENT:   You have the right to help plan your care  Learn about your health condition and how it may be treated  Discuss treatment options with your caregivers to decide what care you want to receive  You always have the right to refuse treatment  The above information is an  only  It is not intended as medical advice for individual conditions or treatments  Talk to your doctor, nurse or pharmacist before following any medical regimen to see if it is safe and effective for you  © 2014 [address] is for End User's use only and may not be sold, redistributed or otherwise used for commercial purposes  All illustrations and images included in CareNotes® are the copyrighted property of [employer]  or [employer]  Depression   AMBULATORY CARE:   Depression  is a medical condition that causes feelings of sadness or hopelessness that do not go away  Depression may cause you to lose interest in things you used to enjoy  These feelings may interfere with your daily life  Common symptoms include the following:   · Appetite changes, or weight gain or loss    · Trouble going to sleep or staying asleep, or sleeping too much    · Fatigue or lack of energy    · Feeling restless, irritable, or withdrawn    · Feeling worthless, hopeless, discouraged, or guilty    · Trouble concentrating, remembering things, doing daily tasks, or making decisions    · Thoughts about hurting or killing yourself    Call your local emergency number (911 in the [address]) if:   · You think about harming yourself or someone else  · You have done something on purpose to hurt yourself      Call your therapist or doctor if:   · Your symptoms do not improve  · You cannot make it to your next appointment  · You have new symptoms  · You have questions or concerns about your condition or care  The following resources are available at any time to help you, if needed:   · [address] [facility]: 1-593.926.1398 (6-178-247-KCHQ)     · Suicide Hotline: 0-443.873.9354 (5-559-CFIUMKP)     · For a list of international numbers: https://save org/find-help/international-resources/    Treatment for depression  may include medicine to relieve depression  Medicine is often used together with therapy  Therapy is a way for you to talk about your feelings and anything that may be causing depression  Therapy can be done alone or in a group  It may also be done with family members or a significant other  Self-care:   · Get regular physical activity  Try to be active for 30 minutes, 3 to 5 days a week  Physical activity can help relieve depression  Work with your healthcare provider to develop a plan that you enjoy  It may help to ask someone to be active with you  · Create a regular sleep schedule  A routine can help you relax before bed  Listen to music, read, or do yoga  Try to go to bed and wake up at the same time every day  Sleep is important for emotional health  · Eat a variety of healthy foods  Healthy foods include fruits, vegetables, whole-grain breads, low-fat dairy products, lean meats, fish, and cooked beans  A healthy meal plan is low in fat, salt, and added sugar  · Do not drink alcohol or use drugs  Alcohol and drugs can make depression worse  Talk to your therapist or doctor if you need help quitting  Follow up with your healthcare provider as directed: Your healthcare provider will monitor your progress at follow-up visits  He or she will also monitor your medicine if you take antidepressants  Your healthcare provider will ask if the medicine is helping   Tell him or her about any side effects or problems you may have with your medicine  The type or amount of medicine may need to be changed  Write down your questions so you remember to ask them during your visits  © Copyright [employer] 2022 Information is for End User's use only and may not be sold, redistributed or otherwise used for commercial purposes  All illustrations and images included in CareNotes® are the copyrighted property of [employer]  or [facility] [employer]   The above information is an  only  It is not intended as medical advice for individual conditions or treatments  Talk to your doctor, nurse or pharmacist before following any medical regimen to see if it is safe and effective for you        OSTEOPATHIC MANIPULATIVE TREATMENT

## 2022-04-19 NOTE — ASSESSMENT & PLAN NOTE
- Pt reports no return callback for behavioral counseling as mentioned at previous office visit  - Pt again declines wanting any anti-anxiety medications but would rather use therapy instead  - She reports that medical marijuana has helped her to abort her anxiety attacks   - Reports her anxiety symptoms are from her stress at work, brother's divorce, and   Plan:  - Referral to psychiatry provided  - Recommended seeking out private /  [facility] therapist   - Revisit anti-anxiety medication at next office visit

## 2022-04-19 NOTE — PROGRESS NOTES
Assessment/Plan:    Depression  -   PHQ-2/9 Depression Screening    Little interest or pleasure in doing things: 3 - nearly every day  Feeling down, depressed, or hopeless: 3 - nearly every day  Trouble falling or staying asleep, or sleeping too much: 2 - more than half the days  Feeling tired or having little energy: 3 - nearly every day  Poor appetite or overeating: 3 - nearly every day  Feeling bad about yourself - or that you are a failure or have let yourself or your family down: 0 - not at all  Trouble concentrating on things, such as reading the newspaper or watching television: 3 - nearly every day  Moving or speaking so slowly that other people could have noticed   Or the opposite - being so fidgety or restless that you have been moving around a lot more than usual: 1 - several days  Thoughts that you would be better off dead, or of hurting yourself in some way: 0 - not at all  PHQ-2 Score: 6  PHQ-2 Interpretation: POSITIVE depression screen  PHQ-9 Score: 18   PHQ-9 Interpretation: Moderately severe depression        - Pt reports a hx of depressive symptoms, reports previous use of medications but dc'd using them d/t side-effects  - Pt wants to undergo therapy  - Currently using medical marijuana which provides partial relief from her symptoms, but says symptoms rapidly return  - Discussed benefits of anti-depressants, and careful attention to side effects  - Currently not experiencing any suicidal/homicidal ideation/intention     Plan:  - Referral to psychiatry provided  - Recommended in office therapy or private therapy   - Referral to in-office psychiatry placed  - Reassess at next office visit    Chronic fatigue  - Pt reports hx of chronic fatigue syndrome  - Recent lab work did not demonstrate any other possible cause for her symptoms  - Currently not experiencing fatigue  - Uses coffee for energy  - Pt reports also using home made vegetable + fruit smoothies  - Chart review did not reveal any previous workup    Plan:  - Secure prior medical records   - Consider new work up for [address] [facility] [address] with Labs at next office visit  - Consider depression/anxiety/stress from work as major component  - Reassess at next office visit     Anxiety  - Pt reports no return callback for behavioral counseling as mentioned at previous office visit  - Pt again declines wanting any anti-anxiety medications but would rather use therapy instead  - She reports that medical marijuana has helped her to abort her anxiety attacks   - Reports her anxiety symptoms are from her stress at work, brother's divorce, and   Plan:  - Referral to psychiatry provided  - Recommended seeking out private /  [facility] therapist   - Revisit anti-anxiety medication at next office visit            Diagnoses and all orders for this visit:    Depression, unspecified depression type  -     Ambulatory Referral to Psychiatry; Future  -     Ambulatory Referral to Psychology; Future    Anxiety  -     Ambulatory Referral to Psychiatry; Future  -     Ambulatory Referral to Psychology; Future    Chronic fatigue  -     Vitamin D 25 hydroxy; Future          Subjective:      Patient ID: Osmin Ng is a 49 y o  female  49 y o female patient with a past medical hx significant for chronic fatigue syndrome returns to clinic for follow up on her anxiety  Per previous physician's note, the pt has been experiencing significant anxiety over the past year, and was recently [facility] for anxiety (vs panic attack) and palpitations  Pt's work up was negative for any acute pathological process and was prescribed hydroxyzine at discharge  Pt reports today still not using medication/hydroxyzine and is still reporting feelings of anxiety throughout most days  While the pt described her hx of anxiety symptoms (in detail) at last office visit, today she reports also experiencing symptoms for depression for which she says started approximately three months ago    She says although she is becoming more and more anxious due to her job, she is also becoming more hopeless and sad due to her brother going through a divorce  She says that the divorce has been hard on not just her but her family as a whole since they are all very close  Pt says that instead of being placed on medications - reports hx of using different medications to treat the aforementioned symptoms with other providers/physicians - she does not want to go back onto medication d/t experiencing significant side effects  The patient says she would like to try therapy first this time prior to starting medication, but only if the therapy is not able to manage her condition(s)  Today the pt reports still having feelings of anxiety, and says she is having an overwhelming feeling of hopelessness due to work consuming her thoughts  However, the pt reports that she is not experiencing any suicidal/homicidal ideation intention               - feelings of depressions building over the last three months  - Stress increasing from school and work   - brother going through divorce  -     The following portions of the patient's history were reviewed and updated as appropriate: allergies, current medications, past family history, past medical history, past social history, past surgical history and problem list     Review of Systems   Constitutional: Positive for appetite change and fatigue  Negative for activity change and unexpected weight change  HENT: Negative for congestion, postnasal drip, rhinorrhea, sinus pressure, sinus pain, sneezing and sore throat  Eyes: Negative  Negative for photophobia and visual disturbance  Respiratory: Negative for cough, chest tightness, shortness of breath and wheezing  Cardiovascular: Negative for chest pain, palpitations and leg swelling  Gastrointestinal: Negative for abdominal pain, constipation, diarrhea, nausea and vomiting  Endocrine: Negative      Genitourinary: Negative for difficulty urinating, dysuria, flank pain, frequency, menstrual problem, pelvic pain and urgency  Musculoskeletal: Negative for back pain and neck pain  Skin: Negative  Allergic/Immunologic: Negative  Neurological: Negative for dizziness, syncope, weakness, light-headedness, numbness and headaches  Psychiatric/Behavioral: Positive for decreased concentration, dysphoric mood and sleep disturbance  Negative for agitation, behavioral problems, confusion, hallucinations, self-injury and suicidal ideas  The patient is nervous/anxious  The patient is not hyperactive  Objective:      /76 (BP Location: Right arm, Patient Position: Sitting, Cuff Size: Standard)   Pulse 76   Temp 98 °F (36 7 °C) (Temporal)   Resp 22   Ht 5' 6" (1 676 m)   Wt 61 2 kg (135 lb)   LMP 03/20/2022   SpO2 98%   BMI 21 79 kg/m²          Physical Exam  Vitals and nursing note reviewed  Constitutional:       Appearance: Normal appearance  She is normal weight  She is not ill-appearing, toxic-appearing or diaphoretic  HENT:      Head: Normocephalic and atraumatic  Right Ear: External ear normal       Left Ear: External ear normal       Nose: Nose normal  No congestion or rhinorrhea  Mouth/Throat:      Mouth: Mucous membranes are moist       Pharynx: Oropharynx is clear  No oropharyngeal exudate or posterior oropharyngeal erythema  Eyes:      General: No scleral icterus  Extraocular Movements: Extraocular movements intact  Conjunctiva/sclera: Conjunctivae normal    Neck:      Vascular: No carotid bruit  Cardiovascular:      Rate and Rhythm: Normal rate and regular rhythm  Pulses: Normal pulses  Heart sounds: Normal heart sounds  No murmur heard  No friction rub  No gallop  Pulmonary:      Effort: Pulmonary effort is normal  No respiratory distress  Breath sounds: Normal breath sounds  No wheezing  Abdominal:      General: Abdomen is flat   Bowel sounds are normal  Tenderness: There is no abdominal tenderness  There is no right CVA tenderness or left CVA tenderness  Musculoskeletal:         General: Normal range of motion  Cervical back: Normal range of motion and neck supple  No rigidity or tenderness  Right lower leg: No edema  Left lower leg: No edema  Lymphadenopathy:      Cervical: No cervical adenopathy  Skin:     General: Skin is warm and dry  Capillary Refill: Capillary refill takes less than 2 seconds  Neurological:      General: No focal deficit present  Mental Status: She is alert and oriented to person, place, and time  Psychiatric:         Attention and Perception: Attention and perception normal  She is attentive  She does not perceive visual hallucinations  Mood and Affect: Mood is anxious and depressed  Mood is not elated  Affect is tearful  Affect is not labile, blunt, flat, angry or inappropriate  Speech: Speech normal  She is communicative  Speech is not rapid and pressured, delayed, slurred or tangential          Behavior: Behavior normal  Behavior is not agitated, slowed, aggressive, withdrawn, hyperactive or combative  Behavior is cooperative  Thought Content: Thought content normal  Thought content is not paranoid or delusional  Thought content does not include homicidal or suicidal ideation  Thought content does not include homicidal or suicidal plan  Cognition and Memory: Cognition normal  Cognition is not impaired  Judgment: Judgment normal  Judgment is not impulsive or inappropriate        Comments: - 47 y o  female well kempt/well-groomed  - Pt appears sad looking, at times inattentive, looks unhappy   - She exhibits speech that is normal in rate, volume, articulation, and is coherent and spontaneous  - Language skills intact  - Signs of severe depression present  - She appears downcast and is tearful  - Body posture and attitude convey signs of anxiety and depressed mood (fidgeting, shrunken shoulders, repeatedly tearful)  - Facial expression and general demeanor demonstrate revealing features for depressed mood   - No apparent signs of hallucinations, delusions, bizarre behaviors, or other indicators of psychotic processes  - Associations are intact and logical  - Tfhought control appears appropriate  - Cognitive functioning and fund of knowledge are intact and age appropriate   - Vocabulary and fund of knowledge indicate normal range of cognitive functioning  - Insight into her current problems appears appropriate/normal   - Judgement appears fair  - Denies any suicidal/homicidal intention/ideation  - Did not assess for short and long term memory  - Did not assess ability for abstract thinking  - Did not assess for arithmetic calculations

## 2022-04-19 NOTE — ASSESSMENT & PLAN NOTE
-   PHQ-2/9 Depression Screening    Little interest or pleasure in doing things: 3 - nearly every day  Feeling down, depressed, or hopeless: 3 - nearly every day  Trouble falling or staying asleep, or sleeping too much: 2 - more than half the days  Feeling tired or having little energy: 3 - nearly every day  Poor appetite or overeating: 3 - nearly every day  Feeling bad about yourself - or that you are a failure or have let yourself or your family down: 0 - not at all  Trouble concentrating on things, such as reading the newspaper or watching television: 3 - nearly every day  Moving or speaking so slowly that other people could have noticed   Or the opposite - being so fidgety or restless that you have been moving around a lot more than usual: 1 - several days  Thoughts that you would be better off dead, or of hurting yourself in some way: 0 - not at all  PHQ-2 Score: 6  PHQ-2 Interpretation: POSITIVE depression screen  PHQ-9 Score: 18   PHQ-9 Interpretation: Moderately severe depression        - Pt reports a hx of depressive symptoms, reports previous use of medications but dc'd using them d/t side-effects  - Pt wants to undergo therapy  - Currently using medical marijuana which provides partial relief from her symptoms, but says symptoms rapidly return  - Discussed benefits of anti-depressants, and careful attention to side effects  - Currently not experiencing any suicidal/homicidal ideation/intention     Plan:  - Referral to psychiatry provided  - Recommended in office therapy or private therapy   - Referral to in-office psychiatry placed  - Reassess at next office visit

## 2022-04-28 ENCOUNTER — TELEPHONE (OUTPATIENT)
Dept: PSYCHIATRY | Facility: CLINIC | Age: 52
End: 2022-04-28

## 2022-05-04 ENCOUNTER — PROCEDURE VISIT (OUTPATIENT)
Dept: FAMILY MEDICINE CLINIC | Facility: CLINIC | Age: 52
End: 2022-05-04
Payer: COMMERCIAL

## 2022-05-04 DIAGNOSIS — M99.00 SOMATIC DYSFUNCTION OF HEAD REGION: ICD-10-CM

## 2022-05-04 DIAGNOSIS — M54.2 NECK PAIN, CHRONIC: Primary | ICD-10-CM

## 2022-05-04 DIAGNOSIS — M99.08 SOMATIC DYSFUNCTION OF RIB: ICD-10-CM

## 2022-05-04 DIAGNOSIS — G89.29 NECK PAIN, CHRONIC: Primary | ICD-10-CM

## 2022-05-04 DIAGNOSIS — M99.02 SOMATIC DYSFUNCTION OF SPINE, THORACIC: ICD-10-CM

## 2022-05-04 DIAGNOSIS — M99.01 SOMATIC DYSFUNCTION OF CERVICAL REGION: ICD-10-CM

## 2022-05-04 PROCEDURE — 99213 OFFICE O/P EST LOW 20 MIN: CPT
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
## 2022-05-18 ENCOUNTER — PROCEDURE VISIT (OUTPATIENT)
Dept: FAMILY MEDICINE CLINIC | Facility: CLINIC | Age: 52
End: 2022-05-18

## 2022-05-18 DIAGNOSIS — M99.01 SOMATIC DYSFUNCTION OF CERVICAL REGION: ICD-10-CM

## 2022-05-18 DIAGNOSIS — M99.02 SOMATIC DYSFUNCTION OF SPINE, THORACIC: ICD-10-CM

## 2022-05-18 DIAGNOSIS — M99.05 SOMATIC DYSFUNCTION OF PELVIS REGION: ICD-10-CM

## 2022-05-18 DIAGNOSIS — M99.03 SOMATIC DYSFUNCTION OF LUMBAR REGION: ICD-10-CM

## 2022-05-18 DIAGNOSIS — G89.29 NECK PAIN, CHRONIC: Primary | ICD-10-CM

## 2022-05-18 DIAGNOSIS — M54.2 NECK PAIN, CHRONIC: Primary | ICD-10-CM

## 2022-05-18 NOTE — PROGRESS NOTES
The Assessment/Plan     This is a 47 y o  female who presents for OMT follow-up for:  1  Neck pain, chronic  OMT   2  Somatic dysfunction of cervical region  OMT   3  Somatic dysfunction of spine, thoracic  OMT   4  Somatic dysfunction of pelvis region  OMT   5  Somatic dysfunction of lumbar region  OMT        1  Patient tolerated OMT well for the above problems,  advised patient to drink fluids and can use NSAID for soreness after treatment     2  OMT Follow up in 3 weeks  Jean Carlos Ruby is a 47 y o  female and is here for a OMT follow up  The patient reports that her chronic neck pain has been bothering her for the past week but it has reduced when compared to her previous visits  She denies any new pain, nor any other associated symptoms  Is the patient taking Pain medication? no  Has the patient completed physical therapy for this condition? yes  Did Patient symptoms improve from last OMT appointment? yes    The following portions of the patient's history were reviewed and updated as appropriate: allergies, current medications, past family history, past medical history, past social history, past surgical history and problem list     Review of Systems  Review of Systems   Constitutional: Positive for activity change and fatigue  HENT: Negative  Respiratory: Negative for shortness of breath and wheezing  Gastrointestinal: Negative  Musculoskeletal: Positive for arthralgias, back pain, neck pain and neck stiffness  Neurological: Positive for headaches  Negative for dizziness, weakness, light-headedness and numbness  Objective     OMT Exam     OMT  Performed by: Elba Fritz DO  Authorized by: Elba Fritz DO   Universal Protocol:  Consent: Verbal consent obtained    Risks and benefits: risks, benefits and alternatives were discussed  Consent given by: patient        Procedure Details:     Region evaluated and treated:  Cervical, Thoracic, Pelvis Innominate and Lumbar    Thoracic Information  Thoracic Region: T1 - T4  Cervical Details:     Examination Method:  Tissue Texture Change, Stability, Laxity, Effusions, Tone, Asymmetry, Misalignment, Crepitation, Defects, Masses, Range of Motion, Contracture and Tenderness, Pain    Severity:  Moderate    Osteopathic Findings:  -  Hypertonic SCM B/L  -  Hypertonic Scalenes (anterior) B/L R>L  -  Hypertonic paravertebral muscles  -  C4 TP anterior     Treatment Method:  Muscle Energy Treatment, Myofascial Release Treatment, Soft Tissue Treatment and Counterstrain Treatment    Response:  Improved - The somatic dysfunction is improved but not completely resolved  Thoracic T1 - T4 details:     Examination Method:  Tissue Texture Change, Stability, Laxity, Effusions, Tone, Asymmetry, Misalignment, Crepitation, Defects, Masses, Range of Motion, Contracture and Tenderness, Pain    Severity:  Moderate    Osteopathic Findings:  - T1-T4 [id number]  - Hypertonic trapezius muscle B/L  - Hypertonic rhomboids R>L   - Hypertonic paravertebral muscles     Treatment Method:  Soft Tissue Treatment, Myofascial Release Treatment, Muscle Energy Treatment and High Velocity, Low Amplitude Treatment    Response:  Improved    Lumbar details:     Examination Method:  Tissue Texture Change, Stability, Laxity, Effusions, Tone, Asymmetry, Misalignment, Crepitation, Defects, Masses, Range of Motion, Contracture and Tenderness, Pain    Severity:  Moderate    Osteopathic Findings:  [id number]    Treatment Method:  Myofascial Release Treatment, Muscle Energy Treatment and Soft Tissue Treatment    Response:  Improved - The somatic dysfunction is improved but not completely resolved      Pelvis Innominate details:     Examination Method:  Tissue Texture Change, Stability, Laxity, Effusions, Tone, Asymmetry, Misalignment, Crepitation, Defects, Masses, Range of Motion, Contracture and Tenderness, Pain    Severity:  Moderate    Osteopathic Findings:  Anterior Inominate R/S Treatment Method:  Soft Tissue Treatment, Myofascial Release Treatment and Muscle Energy Treatment    Response:  Resolved - The somatic dysfunction is completely resolved without evidence of it ever having been present  Total Regions Treated:  4  Attending provider present in exam room for procedure:  No

## 2022-05-18 NOTE — LETTER
May 18, 2022     Patient: Joce Prado  YOB: 1970  Date of Visit: 5/18/2022      To Whom it May Concern:    Beattyvilletanya Galloway is under my professional care  Dustin Saliva was seen in my office on 5/18/2022  Dustin Saliva may return to work on 06/18/2022  If you have any questions or concerns, please don't hesitate to call           Sincerely,          Ford Parks DO        CC: No Recipients

## 2022-06-10 ENCOUNTER — PROCEDURE VISIT (OUTPATIENT)
Dept: FAMILY MEDICINE CLINIC | Facility: CLINIC | Age: 52
End: 2022-06-10

## 2022-06-10 DIAGNOSIS — M54.2 NECK PAIN, CHRONIC: Primary | ICD-10-CM

## 2022-06-10 DIAGNOSIS — M99.02 SOMATIC DYSFUNCTION OF SPINE, THORACIC: ICD-10-CM

## 2022-06-10 DIAGNOSIS — G89.29 NECK PAIN, CHRONIC: Primary | ICD-10-CM

## 2022-06-10 DIAGNOSIS — M99.01 SOMATIC DYSFUNCTION OF CERVICAL REGION: ICD-10-CM

## 2022-06-10 DIAGNOSIS — M99.03 SOMATIC DYSFUNCTION OF LUMBAR REGION: ICD-10-CM

## 2022-06-10 NOTE — PROGRESS NOTES
The Assessment/Plan     This is a 47 y o  female who presents for OMT follow-up for:  1  Neck pain, chronic  OMT      2  Somatic dysfunction of cervical region  OMT      3  Somatic dysfunction of spine, thoracic  OMT      4  Somatic dysfunction of lumbar region  OMT           1  Patient tolerated OMT well for the above problems,  advised patient to drink fluids and can use NSAID for soreness after treatment     2  OMT Follow up in 2 weeks  Rashad Flowers is a 47 y o  female and is here for a OMT follow up  The patient reports her neck pain and back have returned  She says it improved right after her last OMT appointment, but then a few days after she says her pain returned  She denies any changes to her pain from previous OMT appointment  Denies any red flag symptoms today  Is the patient taking Pain medication? no  Has the patient completed physical therapy for this condition? yes  Did Patient symptoms improve from last OMT appointment? yes    The following portions of the patient's history were reviewed and updated as appropriate: allergies, current medications, past family history, past medical history, past social history, past surgical history and problem list     Review of Systems  Review of Systems   Constitutional: Positive for activity change  Negative for appetite change and fatigue  HENT: Negative for congestion  Eyes: Negative for photophobia and pain  Respiratory: Negative for chest tightness, shortness of breath and wheezing  Cardiovascular: Negative for chest pain, palpitations and leg swelling  Gastrointestinal: Negative for abdominal pain, constipation, diarrhea, nausea and vomiting  Genitourinary: Negative for dysuria  Musculoskeletal: Positive for arthralgias, back pain, myalgias, neck pain and neck stiffness  Neurological: Negative for dizziness, syncope, weakness and light-headedness  Psychiatric/Behavioral: Negative for dysphoric mood  Objective     OMT Exam     OMT  Performed by: Arabella Wall DO  Authorized by: Arabella Wall DO   Universal Protocol:  Consent: Verbal consent obtained  Risks and benefits: risks, benefits and alternatives were discussed  Consent given by: patient        Procedure Details:     Region evaluated and treated:  Cervical, Thoracic and Head    Thoracic Information  Thoracic Region: T1 - T4 and T5 - T9  Head Details:     Examination Method:  Tissue Texture Change, Stability, Laxity, Effusions, Tone, Asymmetry, Misalignment, Crepitation, Defects, Masses, Range of Motion, Contracture and Tenderness, Pain    Severity:  Moderate    Osteopathic Findings:  [id number]  Suboccipital strain pattern     Treatment Method:  Cranial Treatment, Osteopathy in the Cranial Field, Cranial Osteopathy, Facilitated Positional Release Treatment, Myofascial Release Treatment, Soft Tissue Treatment and Muscle Energy Treatment    Response:  Improved - The somatic dysfunction is improved but not completely resolved  Cervical Details:     Examination Method:  Tissue Texture Change, Stability, Laxity, Effusions, Tone, Range of Motion, Contracture, Asymmetry, Misalignment, Crepitation, Defects, Masses and Tenderness, Pain    Severity:  Moderate    Osteopathic Findings:  [id number]  Hypertonic paraspinal muscles  Hypertonic R/S Anterior scalene muscle      Treatment Method:  Muscle Energy Treatment, Myofascial Release Treatment and Soft Tissue Treatment    Response:  Improved - The somatic dysfunction is improved but not completely resolved      Thoracic T1 - T4 details:     Examination Method:  Tissue Texture Change, Stability, Laxity, Effusions, Tone, Range of Motion, Contracture, Asymmetry, Misalignment, Crepitation, Defects, Masses and Tenderness, Pain    Severity:  Moderate    Osteopathic Findings:  Hypertonic Paraspinal muscles  Hypertonic Rhomboids B/L R>L  Hypertonic trapezius muscle B/L R=L    Treatment Method:  Myofascial Release Treatment, Muscle Energy Treatment and Soft Tissue Treatment    Response:  Improved    Thoracic T5 - T9 details:     Examination Method:  Tissue Texture Change, Stability, Laxity, Effusions, Tone, Range of Motion, Contracture, Asymmetry, Misalignment, Crepitation, Defects, Masses and Tenderness, Pain    Severity:  Moderate    Osteopathic Findings:  T5-T7 [id number]    Treatment Method:  Muscle Energy Treatment, Soft Tissue Treatment and High Velocity, Low Amplitude Treatment    Response:  Resolved - The somatic dysfunction is completely resolved without evidence of it ever having been present  Total Regions Treated:  3  Attending provider present in exam room for procedure:  Yes

## 2022-06-17 ENCOUNTER — HOSPITAL ENCOUNTER (OUTPATIENT)
Dept: RADIOLOGY | Facility: HOSPITAL | Age: 52
Discharge: HOME | End: 2022-06-17

## 2022-06-17 DIAGNOSIS — M25.511 RIGHT SHOULDER PAIN, UNSPECIFIED CHRONICITY: ICD-10-CM

## 2022-06-28 ENCOUNTER — PROCEDURE VISIT (OUTPATIENT)
Dept: FAMILY MEDICINE CLINIC | Facility: CLINIC | Age: 52
End: 2022-06-28
Payer: COMMERCIAL

## 2022-06-28 DIAGNOSIS — M99.02 SOMATIC DYSFUNCTION OF THORACIC REGION: ICD-10-CM

## 2022-06-28 DIAGNOSIS — M99.08 SOMATIC DYSFUNCTION OF RIB: ICD-10-CM

## 2022-06-28 DIAGNOSIS — G89.29 NECK PAIN, CHRONIC: Primary | ICD-10-CM

## 2022-06-28 DIAGNOSIS — M54.2 NECK PAIN, CHRONIC: Primary | ICD-10-CM

## 2022-06-28 DIAGNOSIS — M99.00 SOMATIC DYSFUNCTION OF HEAD REGION: ICD-10-CM

## 2022-06-28 DIAGNOSIS — M99.01 SOMATIC DYSFUNCTION OF CERVICAL REGION: ICD-10-CM

## 2022-06-28 DIAGNOSIS — M99.07 SOMATIC DYSFUNCTION OF UPPER EXTREMITY: ICD-10-CM

## 2022-06-28 PROCEDURE — 99213 OFFICE O/P EST LOW 20 MIN: CPT | Performed by: FAMILY MEDICINE

## 2022-06-28 NOTE — PATIENT INSTRUCTIONS
Exercises for Internal and External Shoulder Rotation   WHAT YOU NEED TO KNOW:   Exercises for internal shoulder rotation work the muscles in your chest and front of your shoulder  Exercises for external shoulder rotation work the muscles in the back of your shoulder and upper back  DISCHARGE INSTRUCTIONS:   Contact your healthcare provider if:   You have sharp or worsening pain during exercise or at rest     You have questions or concerns about your shoulder exercises  Before you exercise:  Warm up and stretch before you exercise  Walk or ride a stationary bike for 5 to 10 minutes to help you warm up  Stretching helps increase range of motion  It may also decrease muscle soreness and help prevent another injury  Your healthcare provider will tell you which of the following stretches to do:  Crossover arm stretch:  Relax your shoulders  Hold your upper arm with the opposite hand  Pull your arm across your chest until you feel a stretch  Hold the stretch for 30 seconds  Return to the starting position  Shoulder flexion stretch:  Stand facing a wall  Slowly walk your fingers up the wall until you feel a stretch  Hold the stretch for 30 seconds  Return to the starting position  Sleeper stretch:  Lie on your injured side on a firm, flat surface  Bend the elbow of your injured arm 90° with your hand facing up  Use your arm that is not injured to slowly push your injured arm down  Stop when you feel a stretch at the back of your injured shoulder  Hold the stretch for 30 seconds  Slowly return to the starting position  How to exercise with a weight:  Your healthcare provider will tell you how much weight to exercise with  Shoulder internal rotation:  Sit in a chair  Place a rolled up towel between your elbow and your side  Bend your elbow to 90°  Gently squeeze the towel with your elbow to prevent it from falling out  Hold the weight with your thumb pointing up   Slowly move the weight across your chest  Stop when your hand reaches your opposite arm  Hold this position for as many seconds as directed  Slowly return to the starting position  Shoulder external rotation:  Lie on your side with your injured shoulder facing up  Bend your elbow 90°  Place a rolled up towel between your elbow and your side  Hold a weight in your hand  Gently squeeze the towel with your elbow to prevent it from falling out  Slowly rotate your arm outward, but keep your elbow bent  Stop when you feel a stretch  Hold this position for 30 seconds or as directed  Slowly return to the starting position  How to exercise with an exercise band:   Shoulder internal rotation:  Tie one end of the exercise band to a heavy, secure object  Sit in a chair  Place a rolled up towel between your elbow and your side  Bend your elbow to 90°  Gently squeeze the towel with your elbow to prevent it from falling out  Slowly pull the band across your chest  Stop when your hand reaches your opposite arm  Hold this position for as many seconds as directed  Slowly return to the starting position  Shoulder external rotation:  Hold one end of the exercise band on the side that is not injured  Place a rolled up towel between your elbow and your side  Bend your elbow 90°  Squeeze the towel with your elbow  Grab the end of the band and slowly turn your arm outward, but keep your elbow bent  Stop when you feel a stretch  Hold this position for 30 seconds or as directed  Slowly return to the starting position  Follow up with your physical therapist as directed:  Write down your questions so you remember to ask them at your visits  © Copyright [employer] 2022 Information is for End User's use only and may not be sold, redistributed or otherwise used for commercial purposes  All illustrations and images included in CareNotes® are the copyrighted property of [employer]  or [facility] [employer] [employer]   The above information is an  only  It is not intended as medical advice for individual conditions or treatments  Talk to your doctor, nurse or pharmacist before following any medical regimen to see if it is safe and effective for you

## 2022-06-28 NOTE — PROGRESS NOTES
The Assessment/Plan     This is a 49 y o  female who presents for OMT follow-up for:  1  Neck pain, chronic  OMT   2  Somatic dysfunction of cervical region  OMT   3  Somatic dysfunction of head region  OMT   4  Somatic dysfunction of rib  OMT   5  Somatic dysfunction of thoracic region  OMT   6  Somatic dysfunction of upper extremity  OMT        1  Patient tolerated OMT well for the above problems,  advised patient to drink fluids and can use NSAID for soreness after treatment     2  OMT Follow up in 2 weeks  Maty Newman is a 49 y o  female and is here for a OMT follow up  The patient reports she is continuing to have R-shoulder pain and R-sided neck pain  She denies any new injury to the area  She does note some improvement with OMT and doing some more stretching at home  She is not taking anything regularly for pain  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 no  Has the patient completed physical therapy for this condition? yes  Did Patient symptoms improve from last OMT appointment? yes    The following portions of the patient's history were reviewed and updated as appropriate: allergies, current medications, past family history, past medical history, past social history, past surgical history and problem list     Review of Systems  Review of Systems   Constitutional: Negative for chills, fatigue, fever and unexpected weight change     HENT: Negative for congestion, ear pain, postnasal drip, rhinorrhea, sinus pressure, sinus pain, sore throat, tinnitus and trouble swallowing  Eyes: Negative for photophobia, pain, redness and visual disturbance  Respiratory: Negative for cough, shortness of breath and wheezing  Cardiovascular: Negative for chest pain and palpitations  Gastrointestinal: Negative for abdominal pain, constipation, diarrhea, nausea and vomiting  Genitourinary: Negative for dysuria and hematuria  Musculoskeletal: Positive for back pain and neck pain  Negative for arthralgias  R- shoulder pain   Skin: Negative for color change and rash  Neurological: Positive for headaches  Negative for dizziness, seizures and syncope  Psychiatric/Behavioral: Negative for confusion, sleep disturbance and suicidal ideas  The patient is not nervous/anxious  All other systems reviewed and are negative  Objective     OMT Exam     OMT  Performed by: Luann Lozano DO  Authorized by: Luann Lozano DO   [place] Protocol:  Consent: Verbal consent obtained  Consent given by: patient  Patient identity confirmed: verbally with patient        Procedure Details:     Region evaluated and treated:  Head, Cervical, Thoracic, Ribs and Right Extremities    Extremity Information  Extremities: right upper extremity    Thoracic Information  Thoracic Region: T1 - T4 and T5 - T9  Head Details:     Examination Method:  Tissue Texture Change, Stability, Laxity, Effusions, Tone, Range of Motion, Contracture, Asymmetry, Misalignment, Crepitation, Defects, Masses and Tenderness, Pain    Severity:  Moderate    Osteopathic Findings:  - OA FSRRL  - R- inion tender point    Treatment Method:  Counterstrain Treatment, Muscle Energy Treatment, Myofascial Release Treatment and Soft Tissue Treatment    Response:  Improved - The somatic dysfunction is improved but not completely resolved      Cervical Details:     Examination Method:  Tissue Texture Change, Stability, Laxity, Effusions, Tone, Range of Motion, Contracture, Asymmetry, Misalignment, Crepitation, Defects, Masses and Tenderness, Pain    Severity:  Moderate    Osteopathic Findings:  - Bilateral paracervical m  Hypertonicity, R>L  - Bilateral scalene muscle hypertonicity R>L  - Right: C3, C5, C7 tender points  - C3 - FSRRR    Treatment Method:  Counterstrain Treatment, Muscle Energy Treatment, Myofascial Release Treatment and Soft Tissue Treatment    Response:  Improved - The somatic dysfunction is improved but not completely resolved  Thoracic T1 - T4 details:     Examination Method:  Tissue Texture Change, Stability, Laxity, Effusions, Tone, Range of Motion, Contracture, Asymmetry, Misalignment, Crepitation, Defects, Masses and Tenderness, Pain    Severity:  Moderate    Osteopathic Findings:  - Bilateral Trapezius muscle hypertonicity , R>L  - Left PT4 tender point noted  - T4 FSLRL    Treatment Method:  Counterstrain Treatment, Muscle Energy Treatment, Myofascial Release Treatment and Soft Tissue Treatment    Response:  Improved    Thoracic T5 - T9 details:     Examination Method:  Tissue Texture Change, Stability, Laxity, Effusions, Tone, Range of Motion, Contracture, Asymmetry, Misalignment, Crepitation, Defects, Masses and Tenderness, Pain    Severity:  Moderate    Osteopathic Findings:  - Bilateral paraspinal m  Hypertonicity, R>L  - Bilateral PT7 tender points  Treatment Method:  Counterstrain Treatment, Muscle Energy Treatment, Myofascial Release Treatment and Soft Tissue Treatment    Response:  Improved - The somatic dysfunction is improved but not completely resolved      Right Upper Extremity details:     Examination Method:  Tissue Texture Change, Stability, Laxity, Effusions, Tone, Range of Motion, Contracture, Asymmetry, Misalignment, Crepitation, Defects, Masses and Tenderness, Pain    Severity:  Moderate    Osteopathic Findings:  Decreased Internal Rotation  - Deltoid tender points noted Treatment Method:  Counterstrain Treatment, Articulatory Treatment, Muscle Energy Treatment, Myofascial Release Treatment and Soft Tissue Treatment    Response:  Improved - The somatic dysfunction is improved but not completely resolved  Ribs details:     Examination Method:  Tissue Texture Change, Stability, Laxity, Effusions, Tone, Range of Motion, Contracture, Asymmetry, Misalignment, Crepitation, Defects, Masses and Tenderness, Pain    Severity:  Moderate    Osteopathic Findings:  - Right 1st rib elevated    Treatment Method:  Articulatory Treatment, Muscle Energy Treatment and Soft Tissue Treatment    Response:  Improved - The somatic dysfunction is improved but not completely resolved  Total Regions Treated:  5  Attending provider present in exam room for procedure:  No

## 2024-05-09 NOTE — LETTER
May 4, 2022     Patient: Sarina Ash  YOB: 1970  Date of Visit: 5/4/2022      To Whom it May Concern:    Lupe Alvarez is under my professional care  Johanny Bello was seen in my office on 5/4/2022  Johanny Bello may return to work on 05/31/2022  If you have any questions or concerns, please don't hesitate to call           Sincerely,          Leo Amador,         CC: No Recipients PAST MEDICAL HISTORY:  No pertinent past medical history

## 2024-08-01 ENCOUNTER — APPOINTMENT (OUTPATIENT)
Dept: LAB | Facility: HOSPITAL | Age: 54
End: 2024-08-01
Payer: COMMERCIAL

## 2024-08-01 DIAGNOSIS — F31.81 BIPOLAR II DISORDER (HCC): ICD-10-CM

## 2024-08-01 LAB
ALBUMIN SERPL BCG-MCNC: 4.9 G/DL (ref 3.5–5)
ALP SERPL-CCNC: 76 U/L (ref 34–104)
ALT SERPL W P-5'-P-CCNC: 17 U/L (ref 7–52)
AMPHETAMINES SERPL QL SCN: NEGATIVE
ANION GAP SERPL CALCULATED.3IONS-SCNC: 10 MMOL/L (ref 4–13)
AST SERPL W P-5'-P-CCNC: 27 U/L (ref 13–39)
BARBITURATES UR QL: NEGATIVE
BASOPHILS # BLD AUTO: 0.03 THOUSANDS/ÂΜL (ref 0–0.1)
BASOPHILS NFR BLD AUTO: 1 % (ref 0–1)
BENZODIAZ UR QL: NEGATIVE
BILIRUB SERPL-MCNC: 0.43 MG/DL (ref 0.2–1)
BUN SERPL-MCNC: 18 MG/DL (ref 5–25)
CALCIUM SERPL-MCNC: 10.4 MG/DL (ref 8.4–10.2)
CHLORIDE SERPL-SCNC: 102 MMOL/L (ref 96–108)
CO2 SERPL-SCNC: 29 MMOL/L (ref 21–32)
COCAINE UR QL: NEGATIVE
CREAT SERPL-MCNC: 1.08 MG/DL (ref 0.6–1.3)
EOSINOPHIL # BLD AUTO: 0.1 THOUSAND/ÂΜL (ref 0–0.61)
EOSINOPHIL NFR BLD AUTO: 2 % (ref 0–6)
ERYTHROCYTE [DISTWIDTH] IN BLOOD BY AUTOMATED COUNT: 11.6 % (ref 11.6–15.1)
FENTANYL UR QL SCN: NEGATIVE
GFR SERPL CREATININE-BSD FRML MDRD: 59 ML/MIN/1.73SQ M
GLUCOSE SERPL-MCNC: 87 MG/DL (ref 65–140)
HCT VFR BLD AUTO: 43.1 % (ref 34.8–46.1)
HGB BLD-MCNC: 14.6 G/DL (ref 11.5–15.4)
HYDROCODONE UR QL SCN: NEGATIVE
IMM GRANULOCYTES # BLD AUTO: 0.01 THOUSAND/UL (ref 0–0.2)
IMM GRANULOCYTES NFR BLD AUTO: 0 % (ref 0–2)
LYMPHOCYTES # BLD AUTO: 1.97 THOUSANDS/ÂΜL (ref 0.6–4.47)
LYMPHOCYTES NFR BLD AUTO: 31 % (ref 14–44)
MCH RBC QN AUTO: 30.7 PG (ref 26.8–34.3)
MCHC RBC AUTO-ENTMCNC: 33.9 G/DL (ref 31.4–37.4)
MCV RBC AUTO: 91 FL (ref 82–98)
METHADONE UR QL: NEGATIVE
MONOCYTES # BLD AUTO: 0.51 THOUSAND/ÂΜL (ref 0.17–1.22)
MONOCYTES NFR BLD AUTO: 8 % (ref 4–12)
NEUTROPHILS # BLD AUTO: 3.72 THOUSANDS/ÂΜL (ref 1.85–7.62)
NEUTS SEG NFR BLD AUTO: 58 % (ref 43–75)
NRBC BLD AUTO-RTO: 0 /100 WBCS
OPIATES UR QL SCN: NEGATIVE
OXYCODONE+OXYMORPHONE UR QL SCN: NEGATIVE
PCP UR QL: NEGATIVE
PLATELET # BLD AUTO: 287 THOUSANDS/UL (ref 149–390)
PMV BLD AUTO: 10.3 FL (ref 8.9–12.7)
POTASSIUM SERPL-SCNC: 3.8 MMOL/L (ref 3.5–5.3)
PROT SERPL-MCNC: 7.3 G/DL (ref 6.4–8.4)
RBC # BLD AUTO: 4.75 MILLION/UL (ref 3.81–5.12)
SODIUM SERPL-SCNC: 141 MMOL/L (ref 135–147)
T3 SERPL-MCNC: 1.2 NG/ML
T4 SERPL-MCNC: 8.41 UG/DL (ref 6.09–12.23)
THC UR QL: POSITIVE
TSH SERPL DL<=0.05 MIU/L-ACNC: 1.74 UIU/ML (ref 0.45–4.5)
WBC # BLD AUTO: 6.34 THOUSAND/UL (ref 4.31–10.16)

## 2024-08-01 PROCEDURE — 36415 COLL VENOUS BLD VENIPUNCTURE: CPT

## 2024-08-01 PROCEDURE — 84443 ASSAY THYROID STIM HORMONE: CPT

## 2024-08-01 PROCEDURE — 80053 COMPREHEN METABOLIC PANEL: CPT

## 2024-08-01 PROCEDURE — 84436 ASSAY OF TOTAL THYROXINE: CPT

## 2024-08-01 PROCEDURE — 85025 COMPLETE CBC W/AUTO DIFF WBC: CPT

## 2024-08-01 PROCEDURE — 82306 VITAMIN D 25 HYDROXY: CPT

## 2024-08-01 PROCEDURE — 84480 ASSAY TRIIODOTHYRONINE (T3): CPT

## 2024-08-01 PROCEDURE — 80307 DRUG TEST PRSMV CHEM ANLYZR: CPT

## 2024-08-09 LAB
25(OH)D2 SERPL-MCNC: 1.2 NG/ML
25(OH)D3 SERPL-MCNC: 38 NG/ML
25(OH)D3+25(OH)D2 SERPL-MCNC: 39 NG/ML